# Patient Record
Sex: MALE | Race: WHITE | NOT HISPANIC OR LATINO | Employment: OTHER | URBAN - METROPOLITAN AREA
[De-identification: names, ages, dates, MRNs, and addresses within clinical notes are randomized per-mention and may not be internally consistent; named-entity substitution may affect disease eponyms.]

---

## 2019-01-29 ENCOUNTER — HOSPITAL ENCOUNTER (OUTPATIENT)
Dept: MRI IMAGING | Facility: HOSPITAL | Age: 49
Discharge: HOME | End: 2019-01-29
Attending: PSYCHIATRY & NEUROLOGY

## 2019-05-22 ENCOUNTER — CONVERSION ENCOUNTER (OUTPATIENT)
Dept: FAMILY MEDICINE CLINIC | Facility: CLINIC | Age: 49
End: 2019-05-22

## 2019-05-22 ENCOUNTER — OFFICE VISIT CONVERTED (OUTPATIENT)
Dept: FAMILY MEDICINE CLINIC | Facility: CLINIC | Age: 49
End: 2019-05-22
Attending: FAMILY MEDICINE

## 2019-05-23 ENCOUNTER — HOSPITAL ENCOUNTER (OUTPATIENT)
Dept: FAMILY MEDICINE CLINIC | Facility: CLINIC | Age: 49
Discharge: HOME | End: 2019-05-23
Attending: FAMILY MEDICINE

## 2019-05-23 LAB
25(OH)D3 SERPL-MCNC: 21.6 NG/ML (ref 30–100)
ALBUMIN SERPL-MCNC: 4.5 G/DL (ref 3.5–5)
ALBUMIN/GLOB SERPL: 1.7 {RATIO} (ref 1.4–2.6)
ALP SERPL-CCNC: 62 U/L (ref 53–128)
ALT SERPL-CCNC: 37 U/L (ref 10–40)
ANION GAP SERPL CALC-SCNC: 19 MMOL/L (ref 8–19)
AST SERPL-CCNC: 29 U/L (ref 15–50)
BASOPHILS # BLD AUTO: 0.03 10*3/UL (ref 0–0.2)
BASOPHILS NFR BLD AUTO: 0.5 % (ref 0–3)
BILIRUB SERPL-MCNC: 0.71 MG/DL (ref 0.2–1.3)
BUN SERPL-MCNC: 8 MG/DL (ref 5–25)
BUN/CREAT SERPL: 6 {RATIO} (ref 6–20)
CALCIUM SERPL-MCNC: 9.3 MG/DL (ref 8.7–10.4)
CHLORIDE SERPL-SCNC: 104 MMOL/L (ref 99–111)
CHOLEST SERPL-MCNC: 212 MG/DL (ref 107–200)
CHOLEST/HDLC SERPL: 3.7 {RATIO} (ref 3–6)
CONV ABS IMM GRAN: 0.02 10*3/UL (ref 0–0.2)
CONV CO2: 22 MMOL/L (ref 22–32)
CONV IMMATURE GRAN: 0.3 % (ref 0–1.8)
CONV TOTAL PROTEIN: 7.2 G/DL (ref 6.3–8.2)
CREAT UR-MCNC: 1.28 MG/DL (ref 0.7–1.2)
DEPRECATED RDW RBC AUTO: 43.7 FL (ref 35.1–43.9)
EOSINOPHIL # BLD AUTO: 0.22 10*3/UL (ref 0–0.7)
EOSINOPHIL # BLD AUTO: 3.4 % (ref 0–7)
ERYTHROCYTE [DISTWIDTH] IN BLOOD BY AUTOMATED COUNT: 13.4 % (ref 11.6–14.4)
GFR SERPLBLD BASED ON 1.73 SQ M-ARVRAT: >60 ML/MIN/{1.73_M2}
GLOBULIN UR ELPH-MCNC: 2.7 G/DL (ref 2–3.5)
GLUCOSE SERPL-MCNC: 88 MG/DL (ref 70–99)
HBA1C MFR BLD: 15.7 G/DL (ref 14–18)
HCT VFR BLD AUTO: 48.5 % (ref 42–52)
HDLC SERPL-MCNC: 57 MG/DL (ref 40–60)
LDLC SERPL CALC-MCNC: 137 MG/DL (ref 70–100)
LYMPHOCYTES # BLD AUTO: 2.45 10*3/UL (ref 1–5)
MCH RBC QN AUTO: 28.8 PG (ref 27–31)
MCHC RBC AUTO-ENTMCNC: 32.4 G/DL (ref 33–37)
MCV RBC AUTO: 88.8 FL (ref 80–96)
MONOCYTES # BLD AUTO: 0.47 10*3/UL (ref 0.2–1.2)
MONOCYTES NFR BLD AUTO: 7.4 % (ref 3–10)
NEUTROPHILS # BLD AUTO: 3.2 10*3/UL (ref 2–8)
NEUTROPHILS NFR BLD AUTO: 50.1 % (ref 30–85)
NRBC CBCN: 0 % (ref 0–0.7)
OSMOLALITY SERPL CALC.SUM OF ELEC: 290 MOSM/KG (ref 273–304)
PLATELET # BLD AUTO: 339 10*3/UL (ref 130–400)
PMV BLD AUTO: 11.3 FL (ref 9.4–12.4)
POTASSIUM SERPL-SCNC: 4.1 MMOL/L (ref 3.5–5.3)
RBC # BLD AUTO: 5.46 10*6/UL (ref 4.7–6.1)
SODIUM SERPL-SCNC: 141 MMOL/L (ref 135–147)
T4 FREE SERPL-MCNC: 1.2 NG/DL (ref 0.9–1.8)
TRIGL SERPL-MCNC: 91 MG/DL (ref 40–150)
TSH SERPL-ACNC: 2.21 M[IU]/L (ref 0.27–4.2)
VARIANT LYMPHS NFR BLD MANUAL: 38.3 % (ref 20–45)
VLDLC SERPL-MCNC: 18 MG/DL (ref 5–37)
WBC # BLD AUTO: 6.39 10*3/UL (ref 4.8–10.8)

## 2019-06-06 ENCOUNTER — HOSPITAL ENCOUNTER (OUTPATIENT)
Dept: CARDIOLOGY | Facility: HOSPITAL | Age: 49
Discharge: HOME | End: 2019-06-06
Attending: FAMILY MEDICINE

## 2019-08-20 ENCOUNTER — OFFICE VISIT CONVERTED (OUTPATIENT)
Dept: FAMILY MEDICINE CLINIC | Facility: CLINIC | Age: 49
End: 2019-08-20
Attending: FAMILY MEDICINE

## 2019-08-20 ENCOUNTER — CONVERSION ENCOUNTER (OUTPATIENT)
Dept: FAMILY MEDICINE CLINIC | Facility: CLINIC | Age: 49
End: 2019-08-20

## 2020-04-21 ENCOUNTER — TELEPHONE CONVERTED (OUTPATIENT)
Dept: FAMILY MEDICINE CLINIC | Facility: CLINIC | Age: 50
End: 2020-04-21
Attending: FAMILY MEDICINE

## 2020-05-08 ENCOUNTER — HOSPITAL ENCOUNTER (OUTPATIENT)
Dept: FAMILY MEDICINE CLINIC | Facility: CLINIC | Age: 50
Discharge: HOME | End: 2020-05-08
Attending: FAMILY MEDICINE

## 2020-05-08 LAB
ALBUMIN SERPL-MCNC: 4.4 G/DL (ref 3.5–5)
ALBUMIN/GLOB SERPL: 1.6 {RATIO} (ref 1.4–2.6)
ALP SERPL-CCNC: 67 U/L (ref 56–119)
ALT SERPL-CCNC: 32 U/L (ref 10–40)
ANION GAP SERPL CALC-SCNC: 17 MMOL/L (ref 8–19)
AST SERPL-CCNC: 23 U/L (ref 15–50)
BASOPHILS # BLD AUTO: 0.04 10*3/UL (ref 0–0.2)
BASOPHILS NFR BLD AUTO: 0.6 % (ref 0–3)
BILIRUB SERPL-MCNC: 0.49 MG/DL (ref 0.2–1.3)
BUN SERPL-MCNC: 12 MG/DL (ref 5–25)
BUN/CREAT SERPL: 8 {RATIO} (ref 6–20)
CALCIUM SERPL-MCNC: 9.4 MG/DL (ref 8.7–10.4)
CHLORIDE SERPL-SCNC: 105 MMOL/L (ref 99–111)
CHOLEST SERPL-MCNC: 214 MG/DL (ref 107–200)
CHOLEST/HDLC SERPL: 3.8 {RATIO} (ref 3–6)
CONV ABS IMM GRAN: 0.03 10*3/UL (ref 0–0.2)
CONV CO2: 24 MMOL/L (ref 22–32)
CONV IMMATURE GRAN: 0.5 % (ref 0–1.8)
CONV TOTAL PROTEIN: 7.2 G/DL (ref 6.3–8.2)
CREAT UR-MCNC: 1.42 MG/DL (ref 0.7–1.2)
DEPRECATED RDW RBC AUTO: 40.7 FL (ref 35.1–43.9)
EOSINOPHIL # BLD AUTO: 0.18 10*3/UL (ref 0–0.7)
EOSINOPHIL # BLD AUTO: 2.7 % (ref 0–7)
ERYTHROCYTE [DISTWIDTH] IN BLOOD BY AUTOMATED COUNT: 12.8 % (ref 11.6–14.4)
EST. AVERAGE GLUCOSE BLD GHB EST-MCNC: 128 MG/DL
GFR SERPLBLD BASED ON 1.73 SQ M-ARVRAT: 57 ML/MIN/{1.73_M2}
GLOBULIN UR ELPH-MCNC: 2.8 G/DL (ref 2–3.5)
GLUCOSE SERPL-MCNC: 90 MG/DL (ref 70–99)
HBA1C MFR BLD: 6.1 % (ref 3.5–5.7)
HCT VFR BLD AUTO: 50.8 % (ref 42–52)
HDLC SERPL-MCNC: 57 MG/DL (ref 40–60)
HGB BLD-MCNC: 17 G/DL (ref 14–18)
LDLC SERPL CALC-MCNC: 135 MG/DL (ref 70–100)
LYMPHOCYTES # BLD AUTO: 2.81 10*3/UL (ref 1–5)
LYMPHOCYTES NFR BLD AUTO: 42.4 % (ref 20–45)
MCH RBC QN AUTO: 29.1 PG (ref 27–31)
MCHC RBC AUTO-ENTMCNC: 33.5 G/DL (ref 33–37)
MCV RBC AUTO: 86.8 FL (ref 80–96)
MONOCYTES # BLD AUTO: 0.41 10*3/UL (ref 0.2–1.2)
MONOCYTES NFR BLD AUTO: 6.2 % (ref 3–10)
NEUTROPHILS # BLD AUTO: 3.15 10*3/UL (ref 2–8)
NEUTROPHILS NFR BLD AUTO: 47.6 % (ref 30–85)
NRBC CBCN: 0 % (ref 0–0.7)
OSMOLALITY SERPL CALC.SUM OF ELEC: 291 MOSM/KG (ref 273–304)
PLATELET # BLD AUTO: 361 10*3/UL (ref 130–400)
PMV BLD AUTO: 10.7 FL (ref 9.4–12.4)
POTASSIUM SERPL-SCNC: 4.8 MMOL/L (ref 3.5–5.3)
RBC # BLD AUTO: 5.85 10*6/UL (ref 4.7–6.1)
SODIUM SERPL-SCNC: 141 MMOL/L (ref 135–147)
TRIGL SERPL-MCNC: 109 MG/DL (ref 40–150)
VLDLC SERPL-MCNC: 22 MG/DL (ref 5–37)
WBC # BLD AUTO: 6.62 10*3/UL (ref 4.8–10.8)

## 2020-05-09 LAB — 25(OH)D3 SERPL-MCNC: 26.9 NG/ML (ref 30–100)

## 2020-06-08 ENCOUNTER — HOSPITAL ENCOUNTER (OUTPATIENT)
Dept: ULTRASOUND IMAGING | Facility: HOSPITAL | Age: 50
Discharge: HOME | End: 2020-06-08
Attending: UROLOGY

## 2020-06-09 ENCOUNTER — TELEPHONE CONVERTED (OUTPATIENT)
Dept: UROLOGY | Facility: CLINIC | Age: 50
End: 2020-06-09
Attending: UROLOGY

## 2020-10-20 ENCOUNTER — OFFICE VISIT CONVERTED (OUTPATIENT)
Dept: FAMILY MEDICINE CLINIC | Facility: CLINIC | Age: 50
End: 2020-10-20
Attending: FAMILY MEDICINE

## 2020-10-20 ENCOUNTER — CONVERSION ENCOUNTER (OUTPATIENT)
Dept: FAMILY MEDICINE CLINIC | Facility: CLINIC | Age: 50
End: 2020-10-20

## 2021-05-10 NOTE — H&P
History and Physical      Patient Name: Иван Kearney   Patient ID: [id number]   Sex: Male   YOB: 1970    Primary Care Provider: David Markham DO   Referring Provider: David Markham DO    Visit Date: June 9, 2020    Provider: Jarad Dior MD   Location: [facility]   Location Address: [address] [place] [address]  [id number]   Location Phone: [phone]          Chief Complaint  · urologic issues      History Of Present Illness  TELEHEALTH TELEPHONE VISIT  Иван Kearney is a 50 year old /White male who is presenting for evaluation via telehealth telephone visit. Verbal consent obtained before beginning visit.   Provider spent 11 minutes with the patient during the telehealth visit.   The following staff were present during this visit: Palma Ayala   Past Medical History/ Overview of Patient Symptoms       50-year-old gentleman status post right ureteroscopy    No pain or problems.    4/20 right ureteroscopy with laser and stentall stones removed    stent removed at home.    4/3/2020 CT abdomen/pelvis with1.3 cm proximal right ureteral stone.    This is patient's second stone.    First lithotripsy.       Past Medical History  Allergies; Anxiety; Arthritis; Broken Bones; Deafness; Depression; Head injury; Hemorrhoids; Kidney stones; Migraine; Night sweats; Post traumatic stress disorder (PTSD); Psychiatric illness; Sinus trouble; Skin Disease         Past Surgical History  Back surgery; Brain Surgery; Colonoscopy; Leg Surgery; Spinal Surgery; Ureteroscopic Stone Removal; Wrist Surgery         Medication List  Vitamin D2 1,250 mcg (50,000 unit) oral capsule         Allergy List  NO KNOWN DRUG ALLERGIES         Family Medical History  Breast Neoplasm, Malignant         Social History  *Disabled; Alcohol (Current some day); Denies substance abuse; ; Smokeless tobacco (Current every day); Tobacco (Former)         Review of  Systems  · Constitutional  o Denies  o : chills  · Respiratory  o Denies  o : cough  · Gastrointestinal  o Denies  o : nausea              Assessment  · Nephrolithiasis     592.0/N20.0    Problems Reconciled  Plan  · Orders  o Physician Telephone Evaluation, 11-20 minutes (76956) - 592.0/N20.0 - 06/09/2020  · Medications  o Medications have been Reconciled  o Transition of Care or Provider Policy  · Instructions  o Plan Of Care:   o Electronically Identified Patient Education Materials Provided Electronically       The patient was counseled on the preventative measures of kidney stones today.  This included increasing fluid intake to make at least 1.5 ml daily, decreasing meat intake, decreasing salt intake and taking in a normal amount of calcium (1000 mg daily).  Information handout given on this today.     We also discussed the DASH diet today for stone prevention and handout was given    Follow-up PRN             Electronically Signed by: Jarad Dior MD -Author on June 9, 2020 10:26:16 AM

## 2021-05-12 NOTE — PROGRESS NOTES
Quick Note      Patient Name: Иван Kearney   Patient ID: [id number]   Sex: Male   YOB: 1970    Primary Care Provider: David Markham DO   Referring Provider: David Markham DO    Visit Date: April 21, 2020    Provider: David Markham DO   Location: [facility]   Location Address: [address] [place] [address], [address]  [id number]   Location Phone: [phone]          History Of Present Illness  TELEHEALTH TELEPHONE VISIT  Chief Complaint: check up   Иван Kearney is a 50 year old /White male who is presenting for evaluation via telehealth telephone visit. Verbal consent obtained before beginning visit.   Provider spent 22 minutes with the patient during telehealth visit.   The following staff were present during this visit: provider only   Past Medical History/Overview of Patient Symptoms     Patient presents today for a telehealth appointment.  He is accompanied by his wife, Ghazala who helps provide the history and is listening in on the conversation.  This is a telephone appointment.  Patient reports that he is doing a lot better ever since he had his right ureteral stent removed for a 1.3 cm stone on the right.  He had right-sided obstructive uropathy.  He he was also noted to have a nonobstructing left nephrolithiasis at the lower pole measuring 6 mm.  He ended up not having an infection.  He reports not being treated with any antibiotics.  He was noted to have diffuse prostatomegaly as well.  He denies any significant symptoms of lower urinary tract obstruction.  He does have some urinary hesitancy.  It has not been that bothersome.  He is not interested in any medication today.  Reviewing his labs his creatinine jumped from 1.28 back in May 2019 two 1.70.  Discussed having this repeated.  He was previously noted to have prediabetes.  We will check his A1c as well as well as vitamin D.  His vitamin D level back in May was 21.6.  He will come in and get labs  done at his earliest convenience.  He had a calcium oxalate stone.           Assessment  · Vitamin D deficiency     268.9/E55.9  · Nephrolithiasis     592.0/N20.0  · MAXIME (acute kidney injury)     584.9/N17.9  · Medication monitoring encounter     V58.83/Z51.81  · HLD (hyperlipidemia)     272.4/E78.5  · Leukocytosis     288.60/D72.829  · Enlarged prostate     600.00/N40.0  · Screening for prostate cancer     V76.44/Z12.5  · Prediabetes     790.29/R73.03    Problems Reconciled  Plan  · Orders  o CBC with Auto Diff [facility] (32441) - 288.60/D72.829 - 04/21/2020  o CMP [facility] (37662) - 584.9/N17.9 - 04/21/2020  o Hgb A1c [facility] (22308) - 790.29/R73.03 - 04/21/2020  o Lipid Panel [facility] (41330) - 272.4/E78.5 - 04/21/2020  o Vitamin D (25-Hydroxy) Level (20131) - 268.9/E55.9 - 04/21/2020  o Physician Telephone Evaluation, 21-30 minutes (75617) - - 04/21/2020  · Medications  o Vitamin D2 50,000 unit oral capsule   SIG: take 1 capsule (50,000 unit) by oral route once weekly for 90 days   DISP: (13) capsules with 3 refills  Refilled on 04/21/2020     · Instructions  o Plan Of Care:   o Take all medications as prescribed/directed.  o I spent 22 minutes on medical discussion with patient. Plan as documented above. Plan on seeing patient back in 6 months or sooner if needed. He is to call with any questions or concerns. He is not interested in any treatment for enlarged prostate at this time. He has had resolution of symptoms of right flank pain after he had laser lithotripsy and stent placement with removal. He does have chronic low back pain but is not interested in any treatment at this time. He has had surgery previously. He is still taking vitamin D supplementation. I will refill this.  · Disposition  o Follow Up in 6 months.            Electronically Signed by: David Markham DO -Author on April 21, 2020 11:38:06 AM

## 2021-05-13 ENCOUNTER — CONVERSION ENCOUNTER (OUTPATIENT)
Dept: FAMILY MEDICINE CLINIC | Facility: CLINIC | Age: 51
End: 2021-05-13

## 2021-05-13 ENCOUNTER — OFFICE VISIT CONVERTED (OUTPATIENT)
Dept: FAMILY MEDICINE CLINIC | Facility: CLINIC | Age: 51
End: 2021-05-13
Attending: FAMILY MEDICINE

## 2021-05-13 NOTE — PROGRESS NOTES
Progress Note      Patient Name: Иван Kearney   Patient ID: [id number]   Sex: Male   YOB: 1970    Primary Care Provider: David Markham DO   Referring Provider: David Markham DO    Visit Date: October 20, 2020    Provider: David Markham DO   Location: [facility]   Location Address: [address] [place] [address], [address]  [place], [place]  [id number]   Location Phone: [phone]          Chief Complaint  · check up      History Of Present Illness  Иван Kearney is a 50 year old /White male who presents for evaluation and treatment of:      Patient presents today for checkup.  He reports overall doing well.  He has gained more weight.  He is working on getting back into diet and exercise.  I encouraged him to do so.  Heart rate was elevated on intake.  On repeat it was 100 bpm and the rhythm was otherwise regular.  Blood pressure slightly elevated today.  He admits to drinking a pot of coffee before coming up here.  I encouraged him to decrease his caffeine intake.  He is still drinking a lot of soda as well.  Discussed with him cutting soda out of his diet as well.  He does need his labs repeated prior to next appointment.  Discussed checking a thyroid profile as well as testosterone as he does report fatigue.  Discussed seeing him back in 3 months for checkup.  He is compliant with his vitamin D supplementation.       Past Medical History  Allergies; Anxiety; Arthritis; Broken Bones; Deafness; Depression; Head injury; Hemorrhoids; Kidney stones; Migraine; Night sweats; Post traumatic stress disorder (PTSD); Psychiatric illness; Sinus trouble; Skin Disease         Past Surgical History  Back surgery; Brain Surgery; Colonoscopy; Leg Surgery; Spinal Surgery; Ureteroscopic Stone Removal; Wrist Surgery         Medication List  Vitamin D2 1,250 mcg (50,000 unit) oral capsule         Allergy List  NO KNOWN DRUG ALLERGIES         Family Medical History  Breast  "Neoplasm, Malignant         Social History  *Disabled; Alcohol (Current some day); Denies substance abuse; ; Smokeless tobacco (Current every day); Tobacco (Former)         Review of Systems     General: Denies any fever, chills.  Reports fatigue  HEENT:  Denies any vision or hearing changes. Denies any neck tenderness. Denies any headaches. Denies nasal congestion  Cardiovascular: Denies any chest pain or palpitations  respiratory: Denies any cough or wheezing. Denies any shortness of breath  Gastrointestinal: Denies any nausea vomiting or diarrhea, Denies constipation  Extremities: Denies any edema  Psychiatric: Denies any changes in mood or affect  Neurologic: Denies any neurologic deficits  skin: Denies any rashes or lesions.  endocrine: Fatigue  Musculoskeletal: Denies any weakness       Vitals  Date Time BP Position Site L\R Cuff Size HR RR TEMP (F) WT  HT  BMI kg/m2 BSA m2 O2 Sat FR L/min FiO2 HC       10/20/2020 10:11 /104 Sitting    111 - R  98.2 271lbs 1oz 5'  8\" 41.21 2.43 95 %            Physical Examination     General: AAO 3, no acute distress, pleasant  HEENT: Normocephalic, atraumatic  Cardiovascular: Regular rate and rhythm without appreciable murmur  Respiratory: Clear to auscultation bilaterally no RRW  Gastrointestinal: Soft nontender nondistended with bowel sounds present  extremities: No clubbing, cyanosis or edema  Neurologic: CN II through XII grossly intact   Psychiatric: Normal mood and affect           Assessment  · Fatigue     780.79/R53.83  · Vitamin D deficiency     268.9/E55.9  · Screening for depression     V79.0/Z13.89  · Screening for prostate cancer     V76.44/Z12.5  · HLD (hyperlipidemia)     272.4/E78.5  · Hypertension     401.9/I10  · Medication monitoring encounter     V58.83/Z51.81  · Prediabetes     790.29/R73.03  · Low testosterone     790.99/R79.89  Plan as documented above. Plan on seeing patient back in 3 months or sooner if needed. Patient to call with any " questions or concerns. He will continue taking vitamin D supplementation. We will check testosterone as well as thyroid profile.      Plan  · Orders  o CBC with Auto Diff [facility] (62302) - 401.9/I10, V58.83/Z51.81 - 01/20/2021  o CMP [facility] (85271) - 401.9/I10, V58.83/Z51.81 - 01/20/2021  o Hgb A1c [facility] (97990) - 790.29/R73.03 - 01/20/2021  o Lipid Panel [facility] (22058) - 272.4/E78.5, V58.83/Z51.81 - 01/20/2021  o Thyroid Profile (27030, 81467, THYII) - 780.79/R53.83 - 01/20/2021  o Vitamin D (25-Hydroxy) Level (04129) - 268.9/E55.9, V58.83/Z51.81 - 01/20/2021  o ACO-39: Current medications updated and reviewed (, 1159F) - - 10/20/2020  o ACO-18: Negative screen for clinical depression using a standardized tool () - - 10/20/2020   4 points  o ACO-14: Influenza immunization was not administered for reasons documented [facility] () - - 10/20/2020   out of stock  o Testosterone (Total) (05404) - 790.99/R79.89 - 01/20/2021  o SHBG (sex hormone binding globulin) (06601) - V58.83/Z51.81, 790.99/R79.89 - 01/20/2021  · Instructions  o Depression Screen completed and scanned into the EMR under the designated folder within the patient's documents.  o Today's PHQ-9 result is __4_  o Patient was educated/instructed on their diagnosis, treatment and medications prior to discharge from the clinic today.  o Patient instructed to seek medical attention urgently for new or worsening symptoms.  o Call the office with any concerns or questions.  · Disposition  o Follow Up in 3 months.            Electronically Signed by: David Markham DO - on October 20, 2020 12:35:41 PM

## 2021-05-14 VITALS
BODY MASS INDEX: 41.08 KG/M2 | DIASTOLIC BLOOD PRESSURE: 104 MMHG | HEART RATE: 111 BPM | OXYGEN SATURATION: 95 % | WEIGHT: 271.06 LBS | SYSTOLIC BLOOD PRESSURE: 138 MMHG | TEMPERATURE: 98.2 F | HEIGHT: 68 IN

## 2021-05-15 VITALS
SYSTOLIC BLOOD PRESSURE: 126 MMHG | BODY MASS INDEX: 40.07 KG/M2 | WEIGHT: 264.37 LBS | OXYGEN SATURATION: 96 % | HEIGHT: 68 IN | DIASTOLIC BLOOD PRESSURE: 90 MMHG | HEART RATE: 90 BPM | TEMPERATURE: 98.1 F

## 2021-05-15 VITALS
TEMPERATURE: 98.2 F | DIASTOLIC BLOOD PRESSURE: 84 MMHG | OXYGEN SATURATION: 94 % | HEIGHT: 68 IN | BODY MASS INDEX: 39.91 KG/M2 | SYSTOLIC BLOOD PRESSURE: 118 MMHG | HEART RATE: 96 BPM | WEIGHT: 263.37 LBS

## 2021-05-24 ENCOUNTER — HOSPITAL ENCOUNTER (OUTPATIENT)
Dept: FAMILY MEDICINE CLINIC | Facility: CLINIC | Age: 51
Discharge: HOME | End: 2021-05-24
Attending: FAMILY MEDICINE

## 2021-05-24 LAB
25(OH)D3 SERPL-MCNC: 27.2 NG/ML (ref 30–100)
ALBUMIN SERPL-MCNC: 4.2 G/DL (ref 3.5–5)
ALBUMIN/GLOB SERPL: 1.3 {RATIO} (ref 1.4–2.6)
ALP SERPL-CCNC: 61 U/L (ref 56–119)
ALT SERPL-CCNC: 30 U/L (ref 10–40)
ANION GAP SERPL CALC-SCNC: 15 MMOL/L (ref 8–19)
AST SERPL-CCNC: 24 U/L (ref 15–50)
BASOPHILS # BLD AUTO: 0.05 10*3/UL (ref 0–0.2)
BASOPHILS NFR BLD AUTO: 0.6 % (ref 0–3)
BILIRUB SERPL-MCNC: 0.58 MG/DL (ref 0.2–1.3)
BUN SERPL-MCNC: 14 MG/DL (ref 5–25)
BUN/CREAT SERPL: 10 {RATIO} (ref 6–20)
CALCIUM SERPL-MCNC: 9.1 MG/DL (ref 8.7–10.4)
CHLORIDE SERPL-SCNC: 104 MMOL/L (ref 99–111)
CHOLEST SERPL-MCNC: 210 MG/DL (ref 107–200)
CHOLEST/HDLC SERPL: 3.6 {RATIO} (ref 3–6)
CONV ABS IMM GRAN: 0.04 10*3/UL (ref 0–0.2)
CONV CO2: 24 MMOL/L (ref 22–32)
CONV IMMATURE GRAN: 0.5 % (ref 0–1.8)
CONV TOTAL PROTEIN: 7.4 G/DL (ref 6.3–8.2)
CREAT UR-MCNC: 1.35 MG/DL (ref 0.7–1.2)
DEPRECATED RDW RBC AUTO: 41.1 FL (ref 35.1–43.9)
EOSINOPHIL # BLD AUTO: 0.18 10*3/UL (ref 0–0.7)
EOSINOPHIL # BLD AUTO: 2.1 % (ref 0–7)
ERYTHROCYTE [DISTWIDTH] IN BLOOD BY AUTOMATED COUNT: 12.9 % (ref 11.6–14.4)
EST. AVERAGE GLUCOSE BLD GHB EST-MCNC: 117 MG/DL
GFR SERPLBLD BASED ON 1.73 SQ M-ARVRAT: 60 ML/MIN/{1.73_M2}
GLOBULIN UR ELPH-MCNC: 3.2 G/DL (ref 2–3.5)
GLUCOSE SERPL-MCNC: 89 MG/DL (ref 70–99)
HBA1C MFR BLD: 5.7 % (ref 3.5–5.7)
HCT VFR BLD AUTO: 47.6 % (ref 42–52)
HDLC SERPL-MCNC: 59 MG/DL (ref 40–60)
HGB BLD-MCNC: 16 G/DL (ref 14–18)
LDLC SERPL CALC-MCNC: 131 MG/DL (ref 70–100)
LYMPHOCYTES # BLD AUTO: 3.21 10*3/UL (ref 1–5)
LYMPHOCYTES NFR BLD AUTO: 38 % (ref 20–45)
MCH RBC QN AUTO: 29.3 PG (ref 27–31)
MCHC RBC AUTO-ENTMCNC: 33.6 G/DL (ref 33–37)
MCV RBC AUTO: 87.2 FL (ref 80–96)
MONOCYTES # BLD AUTO: 0.66 10*3/UL (ref 0.2–1.2)
MONOCYTES NFR BLD AUTO: 7.8 % (ref 3–10)
NEUTROPHILS # BLD AUTO: 4.3 10*3/UL (ref 2–8)
NEUTROPHILS NFR BLD AUTO: 51 % (ref 30–85)
NRBC CBCN: 0 % (ref 0–0.7)
OSMOLALITY SERPL CALC.SUM OF ELEC: 288 MOSM/KG (ref 273–304)
PLATELET # BLD AUTO: 356 10*3/UL (ref 130–400)
PMV BLD AUTO: 11.1 FL (ref 9.4–12.4)
POTASSIUM SERPL-SCNC: 4.3 MMOL/L (ref 3.5–5.3)
RBC # BLD AUTO: 5.46 10*6/UL (ref 4.7–6.1)
SODIUM SERPL-SCNC: 139 MMOL/L (ref 135–147)
T4 FREE SERPL-MCNC: 1.2 NG/DL (ref 0.9–1.8)
TESTOST SERPL-MCNC: 293 NG/DL (ref 193–740)
TRIGL SERPL-MCNC: 98 MG/DL (ref 40–150)
TSH SERPL-ACNC: 2.24 M[IU]/L (ref 0.27–4.2)
VLDLC SERPL-MCNC: 20 MG/DL (ref 5–37)
WBC # BLD AUTO: 8.44 10*3/UL (ref 4.8–10.8)

## 2021-05-25 LAB — SHBG SERPL-SCNC: 22 NMOL/L (ref 19.3–76.4)

## 2021-06-05 NOTE — PROGRESS NOTES
Progress Note      Patient Name: Иван Kearney   Patient ID: [id number]   Sex: Male   YOB: 1970    Primary Care Provider: David Markham DO   Referring Provider: David Markham DO    Visit Date: May 13, 2021    Provider: David Markham DO   Location: [facility]   Location Address: [address] [place] [address], [address] [place] [address]  [id number]   Location Phone: [phone]          Chief Complaint  · check up  · migraines      History Of Present Illness  Иван Kearney is a 51 year old /White male who presents for evaluation and treatment of:      Presents today for general checkup.  I last saw him he had some issues/concerns about fatigue.  I ordered labs however he did not get these done prior to his January appointment.  He will get these done at his earliest convenience.  He does have issues with migraine headaches and gets more than 6 months but does not want to be on a preventative.  He has been on Imitrex previously and has tolerated it well.  He denies any history of heart disease.  Denies any chest pain when taking Imitrex.  Heart rate slightly elevated today.  He does report drinking a strong coffee before coming in.       Past Medical History  Allergies; Anxiety; Arthritis; Broken Bones; Deafness; Depression; Head injury; Hemorrhoids; Kidney Stones; Migraine; Night sweats; Post traumatic stress disorder (PTSD); Psychiatric illness; Sinus trouble; Skin Disease         Past Surgical History  Back surgery; Brain Surgery; Colonoscopy; Leg Surgery; Spinal Surgery; Ureteroscopic Stone Removal; Wrist Surgery         Medication List  Vitamin D2 1,250 mcg (50,000 unit) oral capsule         Allergy List  NO KNOWN DRUG ALLERGIES         Family Medical History  Breast Neoplasm, Malignant         Social History  *Disabled; Alcohol (Current some day); Denies substance abuse; ; Smokeless tobacco (Current every day); Tobacco (Former)         Review of  "Systems     Gen: Denies any fever, chills, or weight changes  HEENT: Denies any changes in vision or hearing, denies nasal congestion, denies any neck tenderness or lymphadenopathy  Extremities: Denies edema  Psychiatric: Denies any changes in mood or affect  Neurologic: Migraines  Skin: Denies any rashes       Vitals  Date Time BP Position Site L\R Cuff Size HR RR TEMP (F) WT  HT  BMI kg/m2 BSA m2 O2 Sat FR L/min FiO2 HC       05/13/2021 11:08 /88 Sitting    100 - R  97.5 272lbs 2oz 5'  8\" 41.38 2.43 98 %            Physical Examination     General: AAO 3, no acute distress, pleasant  HEENT: Normocephalic, atraumatic  Cardiovascular: Regular rate and rhythm without appreciable murmur  Respiratory: Clear to auscultation bilaterally no RRW  Gastrointestinal: Soft nontender nondistended with bowel sounds present  extremities: No edema  Neurologic: CN II through XII grossly intact   Psychiatric: Normal mood and affect           Assessment  · Fatigue     780.79/R53.83  · Migraine     346.90/G43.909      Plan  · Orders  o ACO-39: Current medications updated and reviewed (1159F, ) - - 05/13/2021  · Medications  o Imitrex 100 mg oral tablet   SIG: take 1 tab PO with fluids at onset of a migraine attack; repeat after 2 hours if headache returns   DISP: (20) Tablet with 1 refills  Prescribed on 05/13/2021     o Vitamin D2 1,250 mcg (50,000 unit) oral capsule   SIG: take 1 capsule (50,000 unit) by oral route once weekly for 90 days   DISP: (13) Capsule with 3 refills  Refilled on 05/13/2021     o Medications have been Reconciled  o Transition of Care or Provider Policy  · Instructions  o Patient was educated/instructed on their diagnosis, treatment and medications prior to discharge from the clinic today.  o Patient instructed to seek medical attention urgently for new or worsening symptoms.  o Call the office with any concerns or questions.  o Imitrex has been prescribed for patient today. Risk and benefits " discussed. Vitamin D has been refilled. I will see him back in 6 months or sooner if needed. Patient is directed to call with any questions or concerns.  · Disposition  o Follow Up in 6 months.            Electronically Signed by: David Markham DO - on May 13, 2021 01:14:24 PM

## 2021-07-15 VITALS
OXYGEN SATURATION: 98 % | HEIGHT: 68 IN | TEMPERATURE: 97.5 F | SYSTOLIC BLOOD PRESSURE: 138 MMHG | BODY MASS INDEX: 41.24 KG/M2 | HEART RATE: 100 BPM | DIASTOLIC BLOOD PRESSURE: 88 MMHG | WEIGHT: 272.12 LBS

## 2021-11-16 ENCOUNTER — OFFICE VISIT (OUTPATIENT)
Dept: FAMILY MEDICINE CLINIC | Facility: CLINIC | Age: 51
End: 2021-11-16

## 2021-11-16 VITALS
TEMPERATURE: 97.7 F | BODY MASS INDEX: 42.1 KG/M2 | SYSTOLIC BLOOD PRESSURE: 142 MMHG | DIASTOLIC BLOOD PRESSURE: 90 MMHG | WEIGHT: 277.8 LBS | HEART RATE: 101 BPM | HEIGHT: 68 IN | OXYGEN SATURATION: 97 %

## 2021-11-16 DIAGNOSIS — Z51.81 MEDICATION MONITORING ENCOUNTER: ICD-10-CM

## 2021-11-16 DIAGNOSIS — Z12.5 SCREENING FOR PROSTATE CANCER: ICD-10-CM

## 2021-11-16 DIAGNOSIS — R73.03 PREDIABETES: ICD-10-CM

## 2021-11-16 DIAGNOSIS — G43.809 OTHER MIGRAINE WITHOUT STATUS MIGRAINOSUS, NOT INTRACTABLE: Primary | ICD-10-CM

## 2021-11-16 DIAGNOSIS — E78.00 PURE HYPERCHOLESTEROLEMIA: ICD-10-CM

## 2021-11-16 DIAGNOSIS — N17.9 AKI (ACUTE KIDNEY INJURY) (HCC): ICD-10-CM

## 2021-11-16 DIAGNOSIS — E55.9 VITAMIN D DEFICIENCY: ICD-10-CM

## 2021-11-16 DIAGNOSIS — M54.2 NECK PAIN: ICD-10-CM

## 2021-11-16 PROCEDURE — 99214 OFFICE O/P EST MOD 30 MIN: CPT | Performed by: FAMILY MEDICINE

## 2021-11-16 RX ORDER — ERGOCALCIFEROL 1.25 MG/1
CAPSULE ORAL
COMMUNITY
Start: 2021-11-06 | End: 2022-01-24 | Stop reason: SDUPTHER

## 2021-11-16 RX ORDER — SUMATRIPTAN 100 MG/1
TABLET, FILM COATED ORAL
COMMUNITY
Start: 2021-11-11 | End: 2021-11-16 | Stop reason: SDUPTHER

## 2021-11-16 RX ORDER — SUMATRIPTAN 100 MG/1
100 TABLET, FILM COATED ORAL ONCE AS NEEDED
Qty: 30 TABLET | Refills: 3 | Status: SHIPPED | OUTPATIENT
Start: 2021-11-16 | End: 2022-01-24 | Stop reason: SDUPTHER

## 2021-11-16 RX ORDER — TIZANIDINE 4 MG/1
4 TABLET ORAL EVERY 8 HOURS PRN
Qty: 90 TABLET | Refills: 1 | Status: SHIPPED | OUTPATIENT
Start: 2021-11-16

## 2021-11-16 NOTE — PROGRESS NOTES
"Chief Complaint  Follow-up, Headache, and Neck Pain    Subjective          Иван Kearney presents to [facility]  History of Present Illness  Patient presents today for follow-up for migraine headaches.  He reports having had about 5 migraine headaches this month.  Admits that a lot of his headaches are stemming from neck pain issues.  He denies any radiation of pain down to his arms.  He reports taking some old hydrocodone that was prescribed about 4 years ago.  I discussed with patient proper evaluation for this.  We will start with an x-ray of the cervical spine.  He reports that he would have taken a muscle relaxer if he had one available.  He is requesting one today.  He will be due for labs when he returns for follow-up in 1 month.  He does take vitamin D for deficiency.  Not requesting refill at this time.  He is requesting refill of Imitrex.  He has taken this without adverse effect.  Admits that it does help out.  I reviewed his previous labs.  He was shown to have prediabetes and elevated LDL.  Plan to reassess these labs when he returns for follow-up.  Objective   Vital Signs:   /90   Pulse 101   Temp 97.7 °F (36.5 °C)   Ht 172.7 cm (68\")   Wt 126 kg (277 lb 12.8 oz)   SpO2 97%   BMI 42.24 kg/m²     Physical Exam   General: AAO ×3, no acute distress, pleasant  HEENT: Normocephalic, atraumatic  Musculoskeletal: Paraspinal hypertonicity of the cervical spine.  Tender to palpation bilaterally more at the base of the cervical spine.  No mass or deformity appreciated  Cardiovascular: Regular rate and rhythm without appreciable murmur  Respiratory: Clear to auscultation bilaterally no RRW  Gastrointestinal: Soft nontender nondistended with bowel sounds present  extremities: No edema  Neurologic: CN II through XII grossly intact   Psychiatric: Normal mood and affect  Result Review :                 Assessment and Plan    Diagnoses and all orders for this " visit:    1. Other migraine without status migrainosus, not intractable (Primary)    2. Neck pain  -     XR Spine Cervical Complete 4 or 5 View; Future    3. Vitamin D deficiency  -     Vitamin D 25 Hydroxy; Future    4. Prediabetes  -     CBC & Differential; Future  -     Comprehensive Metabolic Panel; Future  -     Hemoglobin A1c; Future    5. Pure hypercholesterolemia  -     Lipid Panel; Future    6. Medication monitoring encounter  -     CBC & Differential; Future  -     Comprehensive Metabolic Panel; Future  -     Lipid Panel; Future  -     Vitamin D 25 Hydroxy; Future  -     Hemoglobin A1c; Future  -     PSA Screen; Future    7. MAXIME (acute kidney injury) (HCC)  -     Comprehensive Metabolic Panel; Future    8. Screening for prostate cancer  -     PSA Screen; Future    Other orders  -     SUMAtriptan (IMITREX) 100 MG tablet; Take 1 tablet by mouth 1 (One) Time As Needed for Migraine.  Dispense: 30 tablet; Refill: 3  -     tiZANidine (ZANAFLEX) 4 MG tablet; Take 1 tablet by mouth Every 8 (Eight) Hours As Needed for Muscle Spasms.  Dispense: 90 tablet; Refill: 1  -     Diclofenac Sodium (VOLTAREN) 1 % gel gel; Apply 4 g topically to the appropriate area as directed 4 (Four) Times a Day As Needed (neck pain).  Dispense: 100 g; Refill: 1    Plan as documented above.  We discussed continue current migraine headache management.  I will get an x-ray of the cervical spine as it appears that his migraines are being triggered by pain in his neck.  Further recommendations to follow once results return.  I will give him Zanaflex as well as Voltaren.  Patient instructed to avoid operating heavy machinery or equipment while taking Zanaflex due to drowsiness side effect.  Plan to have labs updated when he returns for follow-up.  I will see him back in 1 month.    Follow Up   Return in about 1 month (around 12/16/2021) for neck pain.  Patient was given instructions and counseling regarding his condition or for health  maintenance advice. Please see specific information pulled into the AVS if appropriate.

## 2021-11-22 ENCOUNTER — HOSPITAL ENCOUNTER (OUTPATIENT)
Dept: GENERAL RADIOLOGY | Facility: HOSPITAL | Age: 51
Discharge: HOME | End: 2021-11-22
Admitting: FAMILY MEDICINE

## 2021-11-22 DIAGNOSIS — M54.2 NECK PAIN: ICD-10-CM

## 2021-11-22 PROCEDURE — 72050 X-RAY EXAM NECK SPINE 4/5VWS: CPT

## 2021-12-21 ENCOUNTER — OFFICE VISIT (OUTPATIENT)
Dept: FAMILY MEDICINE CLINIC | Facility: CLINIC | Age: 51
End: 2021-12-21

## 2021-12-21 VITALS
BODY MASS INDEX: 40.62 KG/M2 | HEART RATE: 140 BPM | HEIGHT: 68 IN | SYSTOLIC BLOOD PRESSURE: 130 MMHG | TEMPERATURE: 98.9 F | OXYGEN SATURATION: 93 % | DIASTOLIC BLOOD PRESSURE: 76 MMHG | WEIGHT: 268 LBS

## 2021-12-21 DIAGNOSIS — J01.00 ACUTE NON-RECURRENT MAXILLARY SINUSITIS: Primary | ICD-10-CM

## 2021-12-21 DIAGNOSIS — R05.9 COUGH: ICD-10-CM

## 2021-12-21 DIAGNOSIS — R06.02 SHORTNESS OF BREATH: ICD-10-CM

## 2021-12-21 DIAGNOSIS — M50.30 DDD (DEGENERATIVE DISC DISEASE), CERVICAL: ICD-10-CM

## 2021-12-21 DIAGNOSIS — G43.809 OTHER MIGRAINE WITHOUT STATUS MIGRAINOSUS, NOT INTRACTABLE: ICD-10-CM

## 2021-12-21 LAB — SARS-COV-2 RNA PNL SPEC NAA+PROBE: DETECTED

## 2021-12-21 PROCEDURE — U0004 COV-19 TEST NON-CDC HGH THRU: HCPCS | Performed by: FAMILY MEDICINE

## 2021-12-21 PROCEDURE — 99214 OFFICE O/P EST MOD 30 MIN: CPT | Performed by: FAMILY MEDICINE

## 2021-12-21 PROCEDURE — U0005 INFEC AGEN DETEC AMPLI PROBE: HCPCS | Performed by: FAMILY MEDICINE

## 2021-12-21 RX ORDER — LEVOFLOXACIN 750 MG/1
750 TABLET ORAL DAILY
Qty: 7 TABLET | Refills: 0 | Status: SHIPPED | OUTPATIENT
Start: 2021-12-21 | End: 2021-12-28

## 2021-12-21 RX ORDER — BROMPHENIRAMINE MALEATE, PSEUDOEPHEDRINE HYDROCHLORIDE, AND DEXTROMETHORPHAN HYDROBROMIDE 2; 30; 10 MG/5ML; MG/5ML; MG/5ML
5 SYRUP ORAL 4 TIMES DAILY PRN
Qty: 140 ML | Refills: 0 | Status: SHIPPED | OUTPATIENT
Start: 2021-12-21 | End: 2021-12-28

## 2021-12-21 RX ORDER — PREDNISONE 20 MG/1
40 TABLET ORAL DAILY
Qty: 10 TABLET | Refills: 0 | Status: SHIPPED | OUTPATIENT
Start: 2021-12-21 | End: 2021-12-26

## 2021-12-21 NOTE — PROGRESS NOTES
"Chief Complaint  Sinus Problem, Cough, Chills, and Neck Pain    Subjective          Иван Kearney presents to [facility]  History of Present Illness  Patient presents today to follow-up for neck pain. I last saw him on 11/16/2021. I did order a x-ray of his neck for further evaluation which showed mild degenerative changes. Reports that his neck pain is better now. Unfortunately with his neck pain issues he has had more issues with migraine headaches. He reports he has had about 6-8 migraine headaches a month. He has been on the more controlled in about 6 migraine headaches a month until recently. He has not been on a controller medication recently as he reports previously being on 1 but reports gaining weight. He does not remember the name of the medication. He does take Imitrex on an as-needed basis. [facility] is still receiving treatment for migraine headaches.  Patient is here for separate issue today which is a possible sinus infection.  Symptoms started 2 weeks ago around 12/7/2021 but have persisted.  He reports having sinus congestion.  His symptoms improved but then started to worsen again.  He now has congestion in his chest as well and reports coughing up phlegm.  He reports having body aches and chills.  Oxygen saturation is 93% today.  On intake his heart rate was elevated at 140.  Repeat was 115.  I personally repeated his heart rate and it was 118 bpm when I came in to see the patient.  He denies any sore throat.  Temperature is 100.3.  Denies any noted fever.  He has not been vaccinated for COVID-19.  He denies any recent sick contacts  Objective   Vital Signs:   /76   Pulse (!) 140   Temp 98.9 °F (37.2 °C)   Ht 172.7 cm (68\")   Wt 122 kg (268 lb)   SpO2 93%   BMI 40.75 kg/m²     Physical Exam   General: AAO ×3, fatigued appearing, congested  HEENT: Normocephalic, atraumatic, no discharge in the eyes, " nasal congestion noted bilaterally with maxillary and frontal sinus tenderness to palpation, there is no oropharyngeal exudates, there is postnasal drip and erythema, no cervical tenderness or lymphadenopathy  Cardiovascular: Regular rate and rhythm without appreciable murmur  Respiratory: Clear to auscultation bilaterally no RRW.  Cough noted with deep inspiration  Gastrointestinal: Soft nontender nondistended with bowel sounds present  extremities: No edema  Neurologic: CN II through XII grossly intact   Psychiatric: Normal mood and affect  Result Review :                 Assessment and Plan    Diagnoses and all orders for this visit:    1. Cough (Primary)  -     XR Chest PA & Lateral; Future  -     COVID-19,APTIMA PANTHER(TONI),[facility], NP/OP SWAB IN UTM/VTM/[facility] [employer] [facility],24 HR TAT - Swab, Nasopharynx    2. Acute non-recurrent maxillary sinusitis    3. Shortness of breath  -     XR Chest PA & Lateral; Future    4. DDD (degenerative disc disease), cervical    5. Other migraine without status migrainosus, not intractable    Other orders  -     levoFLOXacin (Levaquin) 750 MG tablet; Take 1 tablet by mouth Daily for 7 days.  Dispense: 7 tablet; Refill: 0  -     predniSONE (DELTASONE) 20 MG tablet; Take 2 tablets by mouth Daily for 5 days.  Dispense: 10 tablet; Refill: 0  -     brompheniramine-pseudoephedrine-DM 30-2-10 MG/5ML syrup; Take 5 mL by mouth 4 (Four) Times a Day As Needed for Allergies for up to 7 days.  Dispense: 140 mL; Refill: 0    Discussed treating patient for sinusitis.  He has been symptomatic for about 14 days.  I am concerned that he also has underlying pneumonia.  I would like to go ahead and treat him with Levaquin and prednisone.  He is describing some end expiratory wheezing at nighttime as well.  I will give him Bromfed.  Plan on getting a chest x-ray.  Patient to be Covid test today.  He is encouraged to quarantine at home until results return.  Patient encouraged to stay  well-hydrated.  Further instructions depending on clinical course.  I will see patient back in 1 month.  His neck pain has improved.  We discussed continue current management of migraine headaches.  He may need to be on a controller in the future if he continues to have regular migraine headaches.    Follow Up   Return in about 1 month (around 1/21/2022) for migraine.  Patient was given instructions and counseling regarding his condition or for health maintenance advice. Please see specific information pulled into the AVS if appropriate.

## 2022-01-24 ENCOUNTER — OFFICE VISIT (OUTPATIENT)
Dept: FAMILY MEDICINE CLINIC | Facility: CLINIC | Age: 52
End: 2022-01-24

## 2022-01-24 VITALS
OXYGEN SATURATION: 96 % | TEMPERATURE: 98.6 F | DIASTOLIC BLOOD PRESSURE: 84 MMHG | HEIGHT: 68 IN | BODY MASS INDEX: 39.84 KG/M2 | WEIGHT: 262.9 LBS | HEART RATE: 98 BPM | SYSTOLIC BLOOD PRESSURE: 120 MMHG

## 2022-01-24 DIAGNOSIS — G43.809 OTHER MIGRAINE WITHOUT STATUS MIGRAINOSUS, NOT INTRACTABLE: Primary | ICD-10-CM

## 2022-01-24 DIAGNOSIS — E55.9 VITAMIN D DEFICIENCY: ICD-10-CM

## 2022-01-24 DIAGNOSIS — Z51.81 MEDICATION MONITORING ENCOUNTER: ICD-10-CM

## 2022-01-24 DIAGNOSIS — U07.1 COVID-19 VIRUS INFECTION: ICD-10-CM

## 2022-01-24 DIAGNOSIS — N17.9 AKI (ACUTE KIDNEY INJURY): ICD-10-CM

## 2022-01-24 DIAGNOSIS — E78.00 PURE HYPERCHOLESTEROLEMIA: ICD-10-CM

## 2022-01-24 DIAGNOSIS — M50.30 DDD (DEGENERATIVE DISC DISEASE), CERVICAL: ICD-10-CM

## 2022-01-24 DIAGNOSIS — R73.03 PREDIABETES: ICD-10-CM

## 2022-01-24 DIAGNOSIS — Z12.5 SCREENING FOR PROSTATE CANCER: ICD-10-CM

## 2022-01-24 LAB
25(OH)D3 SERPL-MCNC: 33 NG/ML
ALBUMIN SERPL-MCNC: 4.3 G/DL (ref 3.5–5.2)
ALBUMIN/GLOB SERPL: 1.5 G/DL
ALP SERPL-CCNC: 62 U/L (ref 39–117)
ALT SERPL W P-5'-P-CCNC: 31 U/L (ref 1–41)
ANION GAP SERPL CALCULATED.3IONS-SCNC: 9.2 MMOL/L (ref 5–15)
AST SERPL-CCNC: 26 U/L (ref 1–40)
BASOPHILS # BLD AUTO: 0.05 10*3/MM3 (ref 0–0.2)
BASOPHILS NFR BLD AUTO: 0.8 % (ref 0–1.5)
BILIRUB SERPL-MCNC: 0.8 MG/DL (ref 0–1.2)
BUN SERPL-MCNC: 12 MG/DL (ref 6–20)
BUN/CREAT SERPL: 9 (ref 7–25)
CALCIUM SPEC-SCNC: 9.6 MG/DL (ref 8.6–10.5)
CHLORIDE SERPL-SCNC: 103 MMOL/L (ref 98–107)
CHOLEST SERPL-MCNC: 250 MG/DL (ref 0–200)
CO2 SERPL-SCNC: 25.8 MMOL/L (ref 22–29)
CREAT SERPL-MCNC: 1.34 MG/DL (ref 0.76–1.27)
DEPRECATED RDW RBC AUTO: 44 FL (ref 37–54)
EOSINOPHIL # BLD AUTO: 0.14 10*3/MM3 (ref 0–0.4)
EOSINOPHIL NFR BLD AUTO: 2.3 % (ref 0.3–6.2)
ERYTHROCYTE [DISTWIDTH] IN BLOOD BY AUTOMATED COUNT: 13.7 % (ref 12.3–15.4)
GFR SERPL CREATININE-BSD FRML MDRD: 56 ML/MIN/1.73
GLOBULIN UR ELPH-MCNC: 2.9 GM/DL
GLUCOSE SERPL-MCNC: 78 MG/DL (ref 65–99)
HBA1C MFR BLD: 6.37 % (ref 4.8–5.6)
HCT VFR BLD AUTO: 47.8 % (ref 37.5–51)
HDLC SERPL-MCNC: 65 MG/DL (ref 40–60)
HGB BLD-MCNC: 16 G/DL (ref 13–17.7)
IMM GRANULOCYTES # BLD AUTO: 0.04 10*3/MM3 (ref 0–0.05)
IMM GRANULOCYTES NFR BLD AUTO: 0.7 % (ref 0–0.5)
LDLC SERPL CALC-MCNC: 169 MG/DL (ref 0–100)
LDLC/HDLC SERPL: 2.56 {RATIO}
LYMPHOCYTES # BLD AUTO: 2.38 10*3/MM3 (ref 0.7–3.1)
LYMPHOCYTES NFR BLD AUTO: 39.4 % (ref 19.6–45.3)
MCH RBC QN AUTO: 29.6 PG (ref 26.6–33)
MCHC RBC AUTO-ENTMCNC: 33.5 G/DL (ref 31.5–35.7)
MCV RBC AUTO: 88.5 FL (ref 79–97)
MONOCYTES # BLD AUTO: 0.63 10*3/MM3 (ref 0.1–0.9)
MONOCYTES NFR BLD AUTO: 10.4 % (ref 5–12)
NEUTROPHILS NFR BLD AUTO: 2.8 10*3/MM3 (ref 1.7–7)
NEUTROPHILS NFR BLD AUTO: 46.4 % (ref 42.7–76)
NRBC BLD AUTO-RTO: 0 /100 WBC (ref 0–0.2)
PLATELET # BLD AUTO: 307 10*3/MM3 (ref 140–450)
PMV BLD AUTO: 11 FL (ref 6–12)
POTASSIUM SERPL-SCNC: 4.6 MMOL/L (ref 3.5–5.2)
PROT SERPL-MCNC: 7.2 G/DL (ref 6–8.5)
PSA SERPL-MCNC: 1.19 NG/ML (ref 0–4)
RBC # BLD AUTO: 5.4 10*6/MM3 (ref 4.14–5.8)
SODIUM SERPL-SCNC: 138 MMOL/L (ref 136–145)
TRIGL SERPL-MCNC: 93 MG/DL (ref 0–150)
VLDLC SERPL-MCNC: 16 MG/DL (ref 5–40)
WBC NRBC COR # BLD: 6.04 10*3/MM3 (ref 3.4–10.8)

## 2022-01-24 PROCEDURE — 80061 LIPID PANEL: CPT | Performed by: FAMILY MEDICINE

## 2022-01-24 PROCEDURE — 99214 OFFICE O/P EST MOD 30 MIN: CPT | Performed by: FAMILY MEDICINE

## 2022-01-24 PROCEDURE — 83036 HEMOGLOBIN GLYCOSYLATED A1C: CPT | Performed by: FAMILY MEDICINE

## 2022-01-24 PROCEDURE — 85025 COMPLETE CBC W/AUTO DIFF WBC: CPT | Performed by: FAMILY MEDICINE

## 2022-01-24 PROCEDURE — 82306 VITAMIN D 25 HYDROXY: CPT | Performed by: FAMILY MEDICINE

## 2022-01-24 PROCEDURE — G0103 PSA SCREENING: HCPCS | Performed by: FAMILY MEDICINE

## 2022-01-24 PROCEDURE — 80053 COMPREHEN METABOLIC PANEL: CPT | Performed by: FAMILY MEDICINE

## 2022-01-24 RX ORDER — SUMATRIPTAN 100 MG/1
100 TABLET, FILM COATED ORAL ONCE AS NEEDED
Qty: 30 TABLET | Refills: 3 | Status: SHIPPED | OUTPATIENT
Start: 2022-01-24

## 2022-01-24 RX ORDER — ERGOCALCIFEROL 1.25 MG/1
50000 CAPSULE ORAL
Qty: 13 CAPSULE | Refills: 3 | Status: SHIPPED | OUTPATIENT
Start: 2022-01-24

## 2022-01-24 RX ORDER — TOPIRAMATE 25 MG/1
25 TABLET ORAL 2 TIMES DAILY
Qty: 60 TABLET | Refills: 2 | Status: SHIPPED | OUTPATIENT
Start: 2022-01-24 | End: 2022-10-04

## 2022-01-24 NOTE — PROGRESS NOTES
"Chief Complaint  Follow up for covid-19  Migraine headaches  Vitamin d deficiency     Subjective          Иван Kearney presents to [facility]  History of Present Illness  Patient presents today to follow-up for COVID-19.  Last time I saw him he tested positive for COVID-19.  He reports that his symptoms have resolved.  This was back on 12/21/2021.  He is due for labs today so we discussed getting these done.  He is still having 7 or 8 migraine headaches a month.  We discussed adding Topamax today after risk and benefits were discussed.  He does take Imitrex which does help out however I discussed with him given frequency to add Topamax as a preventative.  He is requesting refill of Imitrex and vitamin D today.  Plan to check vitamin D levels as well.  He does take 50,000 units once a week.  Patient reports that his neck pain has also improved taking tizanidine.  Objective   Vital Signs:   /84   Pulse 98   Temp 98.6 °F (37 °C)   Ht 172.7 cm (68\")   Wt 119 kg (262 lb 14.4 oz)   SpO2 96%   BMI 39.97 kg/m²     Physical Exam   General: AAO ×3, no acute distress, pleasant  HEENT: Normocephalic, atraumatic  Cardiovascular: Regular rate and rhythm without appreciable murmur  Respiratory: Clear to auscultation bilaterally no RRW  Gastrointestinal: Soft nontender nondistended with bowel sounds present  extremities: No edema  Neurologic: CN II through XII grossly intact   Psychiatric: Normal mood and affect  Result Review :                 Assessment and Plan    Diagnoses and all orders for this visit:    1. Other migraine without status migrainosus, not intractable (Primary)    2. COVID-19 virus infection    3. DDD (degenerative disc disease), cervical    4. Vitamin D deficiency    Other orders  -     topiramate (TOPAMAX) 25 MG tablet; Take 1 tablet by mouth 2 (Two) Times a Day.  Dispense: 60 tablet; Refill: 2  -     vitamin D (ERGOCALCIFEROL) 1.25 MG (61140 UT) capsule " capsule; Take 1 capsule by mouth Every 7 (Seven) Days.  Dispense: 13 capsule; Refill: 3  -     SUMAtriptan (IMITREX) 100 MG tablet; Take 1 tablet by mouth 1 (One) Time As Needed for Migraine.  Dispense: 30 tablet; Refill: 3    I discussed with patient getting labs done today.  Discussed seeing him back in 3 months or sooner if needed.  Patient instructed to call with any questions or concerns.  His neck pain has been under control taking tizanidine.  We will continue current management.  Discussed continue current management of vitamin D deficiency as well.  He has resolved from COVID-19.    Follow Up   Return in about 3 months (around 4/24/2022) for migraine.  Patient was given instructions and counseling regarding his condition or for health maintenance advice. Please see specific information pulled into the AVS if appropriate.

## 2022-06-30 ENCOUNTER — OFFICE VISIT (OUTPATIENT)
Dept: FAMILY MEDICINE CLINIC | Facility: CLINIC | Age: 52
End: 2022-06-30

## 2022-06-30 VITALS
DIASTOLIC BLOOD PRESSURE: 78 MMHG | BODY MASS INDEX: 40.42 KG/M2 | OXYGEN SATURATION: 93 % | SYSTOLIC BLOOD PRESSURE: 134 MMHG | WEIGHT: 266.7 LBS | TEMPERATURE: 97.6 F | HEIGHT: 68 IN | HEART RATE: 104 BPM

## 2022-06-30 DIAGNOSIS — L25.5 RHUS DERMATITIS: Primary | ICD-10-CM

## 2022-06-30 PROCEDURE — 99213 OFFICE O/P EST LOW 20 MIN: CPT | Performed by: FAMILY MEDICINE

## 2022-06-30 PROCEDURE — 96372 THER/PROPH/DIAG INJ SC/IM: CPT | Performed by: FAMILY MEDICINE

## 2022-06-30 RX ORDER — PREDNISONE 20 MG/1
TABLET ORAL
Qty: 18 TABLET | Refills: 0 | Status: SHIPPED | OUTPATIENT
Start: 2022-06-30 | End: 2022-10-04

## 2022-06-30 RX ORDER — CLOBETASOL PROPIONATE 0.5 MG/G
1 CREAM TOPICAL 2 TIMES DAILY
Qty: 60 G | Refills: 0 | Status: SHIPPED | OUTPATIENT
Start: 2022-06-30 | End: 2022-12-05

## 2022-06-30 RX ORDER — TRIAMCINOLONE ACETONIDE 40 MG/ML
40 INJECTION, SUSPENSION INTRA-ARTICULAR; INTRAMUSCULAR ONCE
Status: COMPLETED | OUTPATIENT
Start: 2022-06-30 | End: 2022-06-30

## 2022-06-30 RX ADMIN — TRIAMCINOLONE ACETONIDE 40 MG: 40 INJECTION, SUSPENSION INTRA-ARTICULAR; INTRAMUSCULAR at 17:02

## 2022-06-30 NOTE — PROGRESS NOTES
"Chief Complaint  Rash from a vine    Subjective        Иван Kearney presents to [facility]  History of Present Illness  Patient presents for an acute visit.  Patient reports that he was doing yard work about 8 days ago and pulled on a Vine.  He reports that when the van broke and hit the left arm near the wrist area.  He reports that he got in contact with a vine on his legs.  Some of it has improved but some of it is persisting.  Patient presents today for further evaluation.  Objective   Vital Signs:  /78   Pulse 104   Temp 97.6 °F (36.4 °C)   Ht 172.7 cm (68\")   Wt 121 kg (266 lb 11.2 oz)   SpO2 93%   BMI 40.55 kg/m²   Estimated body mass index is 40.55 kg/m² as calculated from the following:    Height as of this encounter: 172.7 cm (68\").    Weight as of this encounter: 121 kg (266 lb 11.2 oz).          Physical Exam   General appearance: Pleasant, nonagitated, no acute distress  Skin: Erythematous rash noted on the lower extremities particularly on his calves.  These are vesicular and there is a linear component to this.  There is also vesicular linear rash on his volar aspect of the left wrist.  Result Review :                Assessment and Plan   Diagnoses and all orders for this visit:    1. Rhus dermatitis (Primary)  -     triamcinolone acetonide (KENALOG-40) injection 40 mg    Other orders  -     clobetasol (TEMOVATE) 0.05 % cream; Apply 1 application topically to the appropriate area as directed 2 (Two) Times a Day.  Dispense: 60 g; Refill: 0  -     predniSONE (DELTASONE) 20 MG tablet; Take 2 tablet PO daily x 5 days, then take 1 tablet PO daily x 5 days, then take 1/2 tablet PO daily x 6 days  Dispense: 18 tablet; Refill: 0    I discussed treatment for Geraldine dermatitis today.  I will give him a Kenalog injection and start him on a prednisone taper as well as give him clobetasol.  No signs of cellulitis today.  Patient instructed to call or return should " there be any worsening of symptoms or no improvement.         Follow Up   No follow-ups on file.  Patient was given instructions and counseling regarding his condition or for health maintenance advice. Please see specific information pulled into the AVS if appropriate.

## 2022-10-04 ENCOUNTER — OFFICE VISIT (OUTPATIENT)
Dept: FAMILY MEDICINE CLINIC | Facility: CLINIC | Age: 52
End: 2022-10-04

## 2022-10-04 VITALS
HEART RATE: 84 BPM | HEIGHT: 68 IN | SYSTOLIC BLOOD PRESSURE: 148 MMHG | DIASTOLIC BLOOD PRESSURE: 98 MMHG | OXYGEN SATURATION: 95 % | WEIGHT: 275.5 LBS | BODY MASS INDEX: 41.75 KG/M2 | TEMPERATURE: 97.9 F

## 2022-10-04 DIAGNOSIS — H53.9 VISION CHANGES: ICD-10-CM

## 2022-10-04 DIAGNOSIS — F43.10 PTSD (POST-TRAUMATIC STRESS DISORDER): ICD-10-CM

## 2022-10-04 DIAGNOSIS — F41.9 ANXIETY: ICD-10-CM

## 2022-10-04 DIAGNOSIS — R03.0 ELEVATED BP WITHOUT DIAGNOSIS OF HYPERTENSION: ICD-10-CM

## 2022-10-04 DIAGNOSIS — G43.809 OTHER MIGRAINE WITHOUT STATUS MIGRAINOSUS, NOT INTRACTABLE: Primary | ICD-10-CM

## 2022-10-04 PROCEDURE — 99213 OFFICE O/P EST LOW 20 MIN: CPT | Performed by: FAMILY MEDICINE

## 2022-10-04 RX ORDER — VENLAFAXINE HYDROCHLORIDE 37.5 MG/1
37.5 CAPSULE, EXTENDED RELEASE ORAL DAILY
Qty: 60 CAPSULE | Refills: 1 | Status: SHIPPED | OUTPATIENT
Start: 2022-10-04 | End: 2022-11-22

## 2022-10-04 NOTE — PROGRESS NOTES
"Chief Complaint  Migraine  Anxiety    Subjective        Иван Kearney presents to [facility]  History of Present Illness  Patient presents today to discuss migraine headaches.  I have previously discussed with him placing him on Topamax 25 mg twice daily.  He reports that about 2 and half months ago he was having a migraine headache every other day.  He reports the Topamax makes him too sleepy so he stopped taking it about a month ago.  He has about 6 or so migraine headaches a month.  He is interested in a preventative.  We also discussed today his blood pressure as it was elevated.  He reports that he has had borderline blood pressures previously.  He reports being irritable this morning as he had a PTSD related nightmares.  He is not currently taking any medication for PTSD.  He also reports that his anxiety has been more bothersome lately.  Objective   Vital Signs:  /98   Pulse 84   Temp 97.9 °F (36.6 °C)   Ht 172.7 cm (68\")   Wt 125 kg (275 lb 8 oz)   SpO2 95%   BMI 41.89 kg/m²   Estimated body mass index is 41.89 kg/m² as calculated from the following:    Height as of this encounter: 172.7 cm (68\").    Weight as of this encounter: 125 kg (275 lb 8 oz).          Physical Exam   General: AAO ×3, no acute distress, pleasant  HEENT: Normocephalic, atraumatic  Cardiovascular: Regular rate and rhythm without appreciable murmur  Respiratory: Clear to auscultation bilaterally no RRW  Gastrointestinal: Soft nontender nondistended with bowel sounds present  extremities: No edema  Neurologic: CN II through XII grossly intact   Psychiatric: Normal mood and affect  Result Review :                Assessment and Plan   Diagnoses and all orders for this visit:    1. Other migraine without status migrainosus, not intractable (Primary)    2. Anxiety    3. Vision changes  -     Ambulatory Referral to Ophthalmology    4. Elevated BP without diagnosis of hypertension    5. PTSD " (post-traumatic stress disorder)    Other orders  -     venlafaxine XR (Effexor XR) 37.5 MG 24 hr capsule; Take 1 capsule by mouth Daily.  Dispense: 60 capsule; Refill: 1    I discussed with patient starting him on Effexor to help out with anxiety, migraine prevention as well as with PTSD.  Plan to monitor his blood pressure for now.  He does have issues with vision changes and he is interested in LASIK eye surgery.  I did discuss with him that issues with his vision can certainly contribute to migraine headaches as well.  Patient does have corrective lenses but does not wear them regularly.         Follow Up   Return in about 2 months (around 12/4/2022) for migraine.  Patient was given instructions and counseling regarding his condition or for health maintenance advice. Please see specific information pulled into the AVS if appropriate.

## 2022-11-22 ENCOUNTER — OFFICE VISIT (OUTPATIENT)
Dept: FAMILY MEDICINE CLINIC | Facility: CLINIC | Age: 52
End: 2022-11-22

## 2022-11-22 VITALS
HEIGHT: 68 IN | DIASTOLIC BLOOD PRESSURE: 96 MMHG | HEART RATE: 89 BPM | SYSTOLIC BLOOD PRESSURE: 138 MMHG | BODY MASS INDEX: 42.65 KG/M2 | OXYGEN SATURATION: 95 % | TEMPERATURE: 97.5 F | WEIGHT: 281.4 LBS

## 2022-11-22 DIAGNOSIS — H35.369 DRUSEN: ICD-10-CM

## 2022-11-22 DIAGNOSIS — Z85.828 HISTORY OF SKIN CANCER: ICD-10-CM

## 2022-11-22 DIAGNOSIS — L98.9 SKIN LESIONS: ICD-10-CM

## 2022-11-22 DIAGNOSIS — F41.9 ANXIETY: ICD-10-CM

## 2022-11-22 DIAGNOSIS — I10 PRIMARY HYPERTENSION: ICD-10-CM

## 2022-11-22 DIAGNOSIS — G43.809 OTHER MIGRAINE WITHOUT STATUS MIGRAINOSUS, NOT INTRACTABLE: Primary | ICD-10-CM

## 2022-11-22 PROCEDURE — 99213 OFFICE O/P EST LOW 20 MIN: CPT | Performed by: FAMILY MEDICINE

## 2022-11-22 RX ORDER — METOPROLOL SUCCINATE 25 MG/1
25 TABLET, EXTENDED RELEASE ORAL DAILY
Qty: 60 TABLET | Refills: 0 | Status: SHIPPED | OUTPATIENT
Start: 2022-11-22 | End: 2023-02-07 | Stop reason: SDUPTHER

## 2022-11-22 RX ORDER — BUSPIRONE HYDROCHLORIDE 10 MG/1
10 TABLET ORAL 2 TIMES DAILY
Qty: 60 TABLET | Refills: 1 | Status: SHIPPED | OUTPATIENT
Start: 2022-11-22 | End: 2023-02-07

## 2022-11-22 RX ORDER — CARBOXYMETHYLCELLULOSE SODIUM 5 MG/ML
SOLUTION/ DROPS OPHTHALMIC
COMMUNITY
Start: 2022-11-16

## 2022-11-22 RX ORDER — BRIMONIDINE TARTRATE 0.15 %
DROPS OPHTHALMIC (EYE)
COMMUNITY
Start: 2022-11-16

## 2022-11-22 NOTE — PROGRESS NOTES
"Chief Complaint  Migraine   BP  Vision changes      Subjective        Иван Kearney presents to [facility]  History of Present Illness  Patient presents today to follow-up for migraine headaches.  He was started on Effexor on last visit to help out with anxiety, migraine headaches as well as PTSD.  He reports that he Effexor made him feel hung over.  He does not like the way it made him feel and he took it for 10 days and continued to have symptoms of this medication was discontinued on his own.  He continues to have migraine headaches about once every other day.  He has previously been on propranolol which he reports caused him to gain weight.  He did not tolerate Topamax.  I discussed with patient trial of metoprolol.  His blood pressure is elevated again today.  I discussed that this will be beneficial in treating hypertension as well.  For anxiety we discussed a trial of BuSpar.  I plan to see him back for close follow-up.  He already has an appointment on 12/5/2022.  He did see ophthalmology.  He was diagnosed with drusen of the optic nerve bilaterally as well as having bilateral cataract.  He does need a new prescription for his vision.  Patient needs to see dermatology again.  He has a history of prior skin cancer.  He has some moles that he needs to have looked at and cut off as well as some other skin issues.  He is requesting a local referral.  Objective   Vital Signs:  /96   Pulse 89   Temp 97.5 °F (36.4 °C)   Ht 172.7 cm (68\")   Wt 128 kg (281 lb 6.4 oz)   SpO2 95%   BMI 42.79 kg/m²   Estimated body mass index is 42.79 kg/m² as calculated from the following:    Height as of this encounter: 172.7 cm (68\").    Weight as of this encounter: 128 kg (281 lb 6.4 oz).          Physical Exam   General: AAO ×3, no acute distress, pleasant  HEENT: Normocephalic, atraumatic  Cardiovascular: Regular rate and rhythm without appreciable murmur  Respiratory: Clear to " auscultation bilaterally no RRW  Gastrointestinal: Soft nontender nondistended with bowel sounds present  extremities: No edema  Neurologic: CN II through XII grossly intact   Psychiatric: Normal mood and affect  Result Review :                Assessment and Plan   Diagnoses and all orders for this visit:    1. Other migraine without status migrainosus, not intractable (Primary)    2. Drusen, bilateral    3. Anxiety    4. History of skin cancer  -     Ambulatory Referral to Dermatology    5. Skin lesions  -     Ambulatory Referral to Dermatology    6. Primary hypertension    Other orders  -     metoprolol succinate XL (Toprol XL) 25 MG 24 hr tablet; Take 1 tablet by mouth Daily.  Dispense: 60 tablet; Refill: 0  -     busPIRone (BUSPAR) 10 MG tablet; Take 1 tablet by mouth 2 (Two) Times a Day.  Dispense: 60 tablet; Refill: 1      I discussed with patient and a referral to dermatology.  We discussed starting metoprolol to help out with migraines as well as with his blood pressure.  I will place him on BuSpar for anxiety.  Plan to see patient back for his next regular scheduled appointment in a couple weeks.       Follow Up   Return for keep next appointment.  Patient was given instructions and counseling regarding his condition or for health maintenance advice. Please see specific information pulled into the AVS if appropriate.

## 2022-12-05 ENCOUNTER — OFFICE VISIT (OUTPATIENT)
Dept: FAMILY MEDICINE CLINIC | Facility: CLINIC | Age: 52
End: 2022-12-05

## 2022-12-05 VITALS
HEART RATE: 82 BPM | SYSTOLIC BLOOD PRESSURE: 132 MMHG | OXYGEN SATURATION: 94 % | BODY MASS INDEX: 43.26 KG/M2 | HEIGHT: 68 IN | WEIGHT: 285.4 LBS | TEMPERATURE: 97.7 F | DIASTOLIC BLOOD PRESSURE: 90 MMHG

## 2022-12-05 DIAGNOSIS — E55.9 VITAMIN D DEFICIENCY: ICD-10-CM

## 2022-12-05 DIAGNOSIS — Z12.5 SCREENING FOR PROSTATE CANCER: ICD-10-CM

## 2022-12-05 DIAGNOSIS — G43.809 OTHER MIGRAINE WITHOUT STATUS MIGRAINOSUS, NOT INTRACTABLE: Primary | ICD-10-CM

## 2022-12-05 DIAGNOSIS — R73.03 PREDIABETES: ICD-10-CM

## 2022-12-05 DIAGNOSIS — I10 PRIMARY HYPERTENSION: ICD-10-CM

## 2022-12-05 DIAGNOSIS — E78.00 PURE HYPERCHOLESTEROLEMIA: ICD-10-CM

## 2022-12-05 DIAGNOSIS — Z51.81 MEDICATION MONITORING ENCOUNTER: ICD-10-CM

## 2022-12-05 DIAGNOSIS — F41.9 ANXIETY: ICD-10-CM

## 2022-12-05 PROCEDURE — 99214 OFFICE O/P EST MOD 30 MIN: CPT | Performed by: FAMILY MEDICINE

## 2022-12-05 NOTE — PROGRESS NOTES
"Chief Complaint  Migraines  Anxiety    Subjective        Иван Kearney presents to [facility]  History of Present Illness  Patient presents today to follow-up for migraine headaches.  He states that the metoprolol has been working better for him.  He has had a reduced frequency of migraine headaches and reports having had about 4 since I last saw him.  He does take the Imitrex which helps out.  He has previously been on trials of other migraine medications including propranolol, Topamax, and Effexor but he had side effects with these medications and he did not receive adequate relief.  I did discuss with him the option to continue propranolol at this time but also to potentially consider a trial of Emgality.  He would like to hold off for now and continue with metoprolol.  His blood pressure is lower today as well so we will continue with the metoprolol for this reason as well.  For anxiety he was given BuSpar but he reports it makes him very sleepy.  He has taken 10 mg but only once a day not twice a day.  I did discuss with him alternative options including lowering the dose to 5 mg which he is agreeable to trying.  He will be due for labs when he returns for his follow-up appointment.  Plan to check a lipid profile.  His last LDL was 169.  He is not currently on a statin.  Plan to also check a vitamin D as well as an A1c.  He was previously noted to be in the prediabetic range at 6.37 with his A1c.  Objective   Vital Signs:  /90   Pulse 82   Temp 97.7 °F (36.5 °C)   Ht 172.7 cm (68\")   Wt 129 kg (285 lb 6.4 oz)   SpO2 94%   BMI 43.39 kg/m²   Estimated body mass index is 43.39 kg/m² as calculated from the following:    Height as of this encounter: 172.7 cm (68\").    Weight as of this encounter: 129 kg (285 lb 6.4 oz).          Physical Exam   General: AAO ×3, no acute distress, pleasant  HEENT: Normocephalic, atraumatic  Cardiovascular: Regular rate and rhythm " without appreciable murmur  Respiratory: Clear to auscultation bilaterally no RRW  Gastrointestinal: Soft nontender nondistended with bowel sounds present  extremities: No edema  Neurologic: CN II through XII grossly intact   Psychiatric: Normal mood and affect  Result Review :                Assessment and Plan   Diagnoses and all orders for this visit:    1. Other migraine without status migrainosus, not intractable (Primary)    2. Anxiety    3. Primary hypertension  -     CBC & Differential; Future  -     Comprehensive Metabolic Panel; Future    4. Vitamin D deficiency  -     Vitamin D,25-Hydroxy; Future    5. Prediabetes  -     Hemoglobin A1c; Future    6. Medication monitoring encounter  -     CBC & Differential; Future  -     Comprehensive Metabolic Panel; Future  -     PSA Screen; Future  -     Hemoglobin A1c; Future  -     Lipid Panel; Future  -     Vitamin D,25-Hydroxy; Future    7. Pure hypercholesterolemia  -     Lipid Panel; Future    8. Screening for prostate cancer  -     PSA Screen; Future    I discussed with patient reducing the dose of BuSpar to take half a tablet or 5 mg twice a day as needed.  We discussed continuing with metoprolol for migraine headaches.  This will also be beneficial for hypertension.  I plan to see him back in 2 months or sooner if needed.  He is instructed to call with any questions or concerns.         Follow Up   Return in about 2 months (around 2/5/2023) for migraines.  Patient was given instructions and counseling regarding his condition or for health maintenance advice. Please see specific information pulled into the AVS if appropriate.

## 2023-02-07 ENCOUNTER — OFFICE VISIT (OUTPATIENT)
Dept: FAMILY MEDICINE CLINIC | Facility: CLINIC | Age: 53
End: 2023-02-07
Payer: MEDICARE

## 2023-02-07 VITALS
HEART RATE: 83 BPM | SYSTOLIC BLOOD PRESSURE: 132 MMHG | HEIGHT: 68 IN | DIASTOLIC BLOOD PRESSURE: 92 MMHG | OXYGEN SATURATION: 95 % | TEMPERATURE: 97.5 F | BODY MASS INDEX: 43.3 KG/M2 | WEIGHT: 285.7 LBS

## 2023-02-07 DIAGNOSIS — E78.00 PURE HYPERCHOLESTEROLEMIA: ICD-10-CM

## 2023-02-07 DIAGNOSIS — Z11.59 NEED FOR HEPATITIS C SCREENING TEST: ICD-10-CM

## 2023-02-07 DIAGNOSIS — Z51.81 MEDICATION MONITORING ENCOUNTER: ICD-10-CM

## 2023-02-07 DIAGNOSIS — E55.9 VITAMIN D DEFICIENCY: ICD-10-CM

## 2023-02-07 DIAGNOSIS — Z23 NEED FOR TDAP VACCINATION: ICD-10-CM

## 2023-02-07 DIAGNOSIS — F41.9 ANXIETY: ICD-10-CM

## 2023-02-07 DIAGNOSIS — R73.03 PREDIABETES: ICD-10-CM

## 2023-02-07 DIAGNOSIS — G43.809 OTHER MIGRAINE WITHOUT STATUS MIGRAINOSUS, NOT INTRACTABLE: Primary | ICD-10-CM

## 2023-02-07 PROCEDURE — 90471 IMMUNIZATION ADMIN: CPT | Performed by: FAMILY MEDICINE

## 2023-02-07 PROCEDURE — 90715 TDAP VACCINE 7 YRS/> IM: CPT | Performed by: FAMILY MEDICINE

## 2023-02-07 PROCEDURE — 99214 OFFICE O/P EST MOD 30 MIN: CPT | Performed by: FAMILY MEDICINE

## 2023-02-07 RX ORDER — METOPROLOL SUCCINATE 25 MG/1
25 TABLET, EXTENDED RELEASE ORAL DAILY
Qty: 90 TABLET | Refills: 1 | Status: SHIPPED | OUTPATIENT
Start: 2023-02-07

## 2023-02-07 RX ORDER — BUSPIRONE HYDROCHLORIDE 10 MG/1
5 TABLET ORAL 2 TIMES DAILY
Qty: 60 TABLET | Refills: 1 | Status: SHIPPED
Start: 2023-02-07

## 2023-02-07 NOTE — PROGRESS NOTES
"Chief Complaint  Migraines    Subjective        Иван Kearney presents to [facility]  History of Present Illness  Patient presents today to follow-up for migraine headaches.  He is taking metoprolol succinate 25 mg daily.  He has been doing much better on this regimen.  He does describe having some issues with migraines related to the weather changes.  He has had about 8 or 9 migraine headaches in the past month.  He reports significant improvement.  I did discuss with him that he still having quite a few but he would like to wait until the weather changes to see if the slowdown.  I did discuss with him the option increase metoprolol to take 50 mg today but he would like to hold off.  Anxiety has been under better control as well as his side effects from BuSpar taking a half a tablet of the 10 mg, or 5 mg twice a day.  He is due for labs so we discussed having him come back at his convenience to have these done.  He is due for a Tdap vaccination so we will get this done.  He quit smoking in 2014.  He smoked less than 20-pack-year history.  Objective   Vital Signs:  /92 (BP Location: Left arm, Patient Position: Sitting)   Pulse 83   Temp 97.5 °F (36.4 °C) (Oral)   Ht 172.7 cm (68\")   Wt 130 kg (285 lb 11.2 oz)   SpO2 95%   BMI 43.44 kg/m²   Estimated body mass index is 43.44 kg/m² as calculated from the following:    Height as of this encounter: 172.7 cm (68\").    Weight as of this encounter: 130 kg (285 lb 11.2 oz).             Physical Exam   General: AAO ×3, no acute distress, pleasant  HEENT: Normocephalic, atraumatic  Cardiovascular: Regular rate and rhythm without appreciable murmur  Respiratory: Clear to auscultation bilaterally no RRW  Gastrointestinal: Soft nontender nondistended with bowel sounds present  extremities: No edema  Neurologic: CN II through XII grossly intact   Psychiatric: Normal mood and affect  Result Review :                   Assessment " and Plan   Diagnoses and all orders for this visit:    1. Other migraine without status migrainosus, not intractable (Primary)    2. Anxiety    3. Prediabetes    4. Vitamin D deficiency    5. Medication monitoring encounter    6. Pure hypercholesterolemia    7. Need for Tdap vaccination    8. Need for hepatitis C screening test  -     Hepatitis C Antibody; Future    Other orders  -     busPIRone (BUSPAR) 10 MG tablet; Take 0.5 tablets by mouth 2 (Two) Times a Day.  Dispense: 60 tablet; Refill: 1  -     metoprolol succinate XL (Toprol XL) 25 MG 24 hr tablet; Take 1 tablet by mouth Daily.  Dispense: 90 tablet; Refill: 1    Plan as documented above.  We discussed continue current management for migraine headaches as well as anxiety.  Plan to have labs drawn at his convenience fasting.  I plan to see him back in 3 months or sooner if needed.  He is instructed to call with any questions or concerns.         Follow Up   Return in about 3 months (around 5/7/2023) for migraine.  Patient was given instructions and counseling regarding his condition or for health maintenance advice. Please see specific information pulled into the AVS if appropriate.

## 2023-05-16 ENCOUNTER — CLINICAL SUPPORT (OUTPATIENT)
Dept: FAMILY MEDICINE CLINIC | Facility: CLINIC | Age: 53
End: 2023-05-16
Payer: MEDICARE

## 2023-05-16 DIAGNOSIS — Z11.59 NEED FOR HEPATITIS C SCREENING TEST: ICD-10-CM

## 2023-05-16 DIAGNOSIS — Z51.81 MEDICATION MONITORING ENCOUNTER: ICD-10-CM

## 2023-05-16 DIAGNOSIS — Z12.5 SCREENING FOR PROSTATE CANCER: ICD-10-CM

## 2023-05-16 DIAGNOSIS — E78.00 PURE HYPERCHOLESTEROLEMIA: ICD-10-CM

## 2023-05-16 DIAGNOSIS — E55.9 VITAMIN D DEFICIENCY: ICD-10-CM

## 2023-05-16 DIAGNOSIS — R73.03 PREDIABETES: ICD-10-CM

## 2023-05-16 DIAGNOSIS — I10 PRIMARY HYPERTENSION: ICD-10-CM

## 2023-05-16 LAB
25(OH)D3 SERPL-MCNC: 39.4 NG/ML (ref 30–100)
ALBUMIN SERPL-MCNC: 4.4 G/DL (ref 3.5–5.2)
ALBUMIN/GLOB SERPL: 1.8 G/DL
ALP SERPL-CCNC: 69 U/L (ref 39–117)
ALT SERPL W P-5'-P-CCNC: 28 U/L (ref 1–41)
ANION GAP SERPL CALCULATED.3IONS-SCNC: 12.1 MMOL/L (ref 5–15)
AST SERPL-CCNC: 29 U/L (ref 1–40)
BASOPHILS # BLD AUTO: 0.03 10*3/MM3 (ref 0–0.2)
BASOPHILS NFR BLD AUTO: 0.5 % (ref 0–1.5)
BILIRUB SERPL-MCNC: 0.5 MG/DL (ref 0–1.2)
BUN SERPL-MCNC: 12 MG/DL (ref 6–20)
BUN/CREAT SERPL: 9.7 (ref 7–25)
CALCIUM SPEC-SCNC: 9.5 MG/DL (ref 8.6–10.5)
CHLORIDE SERPL-SCNC: 105 MMOL/L (ref 98–107)
CHOLEST SERPL-MCNC: 219 MG/DL (ref 0–200)
CO2 SERPL-SCNC: 24.9 MMOL/L (ref 22–29)
CREAT SERPL-MCNC: 1.24 MG/DL (ref 0.76–1.27)
DEPRECATED RDW RBC AUTO: 39.7 FL (ref 37–54)
EGFRCR SERPLBLD CKD-EPI 2021: 69.1 ML/MIN/1.73
EOSINOPHIL # BLD AUTO: 0.15 10*3/MM3 (ref 0–0.4)
EOSINOPHIL NFR BLD AUTO: 2.5 % (ref 0.3–6.2)
ERYTHROCYTE [DISTWIDTH] IN BLOOD BY AUTOMATED COUNT: 12.8 % (ref 12.3–15.4)
GLOBULIN UR ELPH-MCNC: 2.4 GM/DL
GLUCOSE SERPL-MCNC: 87 MG/DL (ref 65–99)
HBA1C MFR BLD: 5.6 % (ref 4.8–5.6)
HCT VFR BLD AUTO: 44.9 % (ref 37.5–51)
HCV AB SER DONR QL: NORMAL
HDLC SERPL-MCNC: 61 MG/DL (ref 40–60)
HGB BLD-MCNC: 15.6 G/DL (ref 13–17.7)
IMM GRANULOCYTES # BLD AUTO: 0.02 10*3/MM3 (ref 0–0.05)
IMM GRANULOCYTES NFR BLD AUTO: 0.3 % (ref 0–0.5)
LDLC SERPL CALC-MCNC: 148 MG/DL (ref 0–100)
LDLC/HDLC SERPL: 2.4 {RATIO}
LYMPHOCYTES # BLD AUTO: 2.34 10*3/MM3 (ref 0.7–3.1)
LYMPHOCYTES NFR BLD AUTO: 38.2 % (ref 19.6–45.3)
MCH RBC QN AUTO: 30 PG (ref 26.6–33)
MCHC RBC AUTO-ENTMCNC: 34.7 G/DL (ref 31.5–35.7)
MCV RBC AUTO: 86.3 FL (ref 79–97)
MONOCYTES # BLD AUTO: 0.53 10*3/MM3 (ref 0.1–0.9)
MONOCYTES NFR BLD AUTO: 8.7 % (ref 5–12)
NEUTROPHILS NFR BLD AUTO: 3.05 10*3/MM3 (ref 1.7–7)
NEUTROPHILS NFR BLD AUTO: 49.8 % (ref 42.7–76)
NRBC BLD AUTO-RTO: 0 /100 WBC (ref 0–0.2)
PLATELET # BLD AUTO: 299 10*3/MM3 (ref 140–450)
PMV BLD AUTO: 11 FL (ref 6–12)
POTASSIUM SERPL-SCNC: 4.1 MMOL/L (ref 3.5–5.2)
PROT SERPL-MCNC: 6.8 G/DL (ref 6–8.5)
PSA SERPL-MCNC: 1.09 NG/ML (ref 0–4)
RBC # BLD AUTO: 5.2 10*6/MM3 (ref 4.14–5.8)
SODIUM SERPL-SCNC: 142 MMOL/L (ref 136–145)
TRIGL SERPL-MCNC: 57 MG/DL (ref 0–150)
VLDLC SERPL-MCNC: 10 MG/DL (ref 5–40)
WBC NRBC COR # BLD: 6.12 10*3/MM3 (ref 3.4–10.8)

## 2023-05-16 PROCEDURE — 85025 COMPLETE CBC W/AUTO DIFF WBC: CPT | Performed by: FAMILY MEDICINE

## 2023-05-16 PROCEDURE — 83036 HEMOGLOBIN GLYCOSYLATED A1C: CPT | Performed by: FAMILY MEDICINE

## 2023-05-16 PROCEDURE — 82306 VITAMIN D 25 HYDROXY: CPT | Performed by: FAMILY MEDICINE

## 2023-05-16 PROCEDURE — 80053 COMPREHEN METABOLIC PANEL: CPT | Performed by: FAMILY MEDICINE

## 2023-05-16 PROCEDURE — 86803 HEPATITIS C AB TEST: CPT | Performed by: FAMILY MEDICINE

## 2023-05-16 PROCEDURE — 36415 COLL VENOUS BLD VENIPUNCTURE: CPT | Performed by: FAMILY MEDICINE

## 2023-05-16 PROCEDURE — G0103 PSA SCREENING: HCPCS | Performed by: FAMILY MEDICINE

## 2023-05-16 PROCEDURE — 80061 LIPID PANEL: CPT | Performed by: FAMILY MEDICINE

## 2023-05-31 ENCOUNTER — OFFICE VISIT (OUTPATIENT)
Dept: FAMILY MEDICINE CLINIC | Facility: CLINIC | Age: 53
End: 2023-05-31

## 2023-05-31 VITALS
HEIGHT: 68 IN | SYSTOLIC BLOOD PRESSURE: 148 MMHG | TEMPERATURE: 97.9 F | WEIGHT: 285.1 LBS | DIASTOLIC BLOOD PRESSURE: 92 MMHG | OXYGEN SATURATION: 95 % | BODY MASS INDEX: 43.21 KG/M2 | HEART RATE: 93 BPM

## 2023-05-31 DIAGNOSIS — Z51.81 MEDICATION MONITORING ENCOUNTER: ICD-10-CM

## 2023-05-31 DIAGNOSIS — M72.2 PLANTAR FASCIITIS, LEFT: ICD-10-CM

## 2023-05-31 DIAGNOSIS — E78.00 PURE HYPERCHOLESTEROLEMIA: ICD-10-CM

## 2023-05-31 DIAGNOSIS — G43.809 OTHER MIGRAINE WITHOUT STATUS MIGRAINOSUS, NOT INTRACTABLE: Primary | ICD-10-CM

## 2023-05-31 DIAGNOSIS — F41.9 ANXIETY: ICD-10-CM

## 2023-05-31 DIAGNOSIS — R73.03 PREDIABETES: ICD-10-CM

## 2023-05-31 DIAGNOSIS — M72.2 PLANTAR FASCIITIS, RIGHT: ICD-10-CM

## 2023-05-31 DIAGNOSIS — I10 PRIMARY HYPERTENSION: ICD-10-CM

## 2023-05-31 DIAGNOSIS — E55.9 VITAMIN D DEFICIENCY: ICD-10-CM

## 2023-05-31 RX ORDER — SUMATRIPTAN 100 MG/1
100 TABLET, FILM COATED ORAL ONCE AS NEEDED
Qty: 30 TABLET | Refills: 3 | Status: SHIPPED | OUTPATIENT
Start: 2023-05-31

## 2023-05-31 RX ORDER — BUSPIRONE HYDROCHLORIDE 10 MG/1
5 TABLET ORAL 2 TIMES DAILY
Qty: 90 TABLET | Refills: 1 | Status: SHIPPED | OUTPATIENT
Start: 2023-05-31

## 2023-05-31 RX ORDER — ERGOCALCIFEROL 1.25 MG/1
50000 CAPSULE ORAL
Qty: 13 CAPSULE | Refills: 3 | Status: SHIPPED | OUTPATIENT
Start: 2023-05-31

## 2023-05-31 RX ORDER — TIZANIDINE 4 MG/1
4 TABLET ORAL EVERY 8 HOURS PRN
Qty: 90 TABLET | Refills: 1 | Status: SHIPPED | OUTPATIENT
Start: 2023-05-31

## 2023-05-31 RX ORDER — METOPROLOL SUCCINATE 25 MG/1
25 TABLET, EXTENDED RELEASE ORAL DAILY
Qty: 90 TABLET | Refills: 3 | Status: SHIPPED | OUTPATIENT
Start: 2023-05-31

## 2023-05-31 NOTE — PROGRESS NOTES
"Chief Complaint  Migraines  Hypertension  Prediabetes  Bilateral foot pain    Subjective        Иван Kearney presents to [facility]  History of Present Illness  Patient presents today to follow-up for migraines, hypertension and prediabetes.  He had labs drawn prior to the appointment.  He tested negative for hepatitis C.  His vitamin D levels are adequate.  He continues to take vitamin D 50,000 units once weekly.  His lipid panel showed his LDL at 148.  We discussed adding a statin today but he would like to hold off at this time.  He would like to work on lifestyle changes including diet and exercise.  His A1c has improved and is now 5.6%.  He was previously in the prediabetic range but again this has improved.  His PSA level is within normal limits.  His CMP shows normal renal function with normal LFTs.  CBC shows no anemia with normal white blood cell count and platelets.  His migraine headaches have been under control taking metoprolol.  He is also taking metoprolol to help out with hypertension.  His blood pressure is elevated today but he reports that he was recently on a road trip and took in a lot of caffeine.  I have encouraged him to check his blood pressure at home and to bring a log on the next visit.  His anxiety symptoms have been manageable taking BuSpar.  We discussed continuing current management at this time.  He does have bilateral foot pain right is worse than left for the past few months.  It hurts especially with the first few steps after he gets up.  He points to the base of the calcaneus on the plantar aspect of both feet.  Objective   Vital Signs:  /92   Pulse 93   Temp 97.9 °F (36.6 °C)   Ht 172.7 cm (68\")   Wt 129 kg (285 lb 1.6 oz)   SpO2 95%   BMI 43.35 kg/m²   Estimated body mass index is 43.35 kg/m² as calculated from the following:    Height as of this encounter: 172.7 cm (68\").    Weight as of this encounter: 129 kg (285 lb 1.6 " oz).             Physical Exam   General: AAO ×3, no acute distress, pleasant  HEENT: Normocephalic, atraumatic  Cardiovascular: Regular rate and rhythm without appreciable murmur  Respiratory: Clear to auscultation bilaterally no RRW  Gastrointestinal: Soft nontender nondistended with bowel sounds present  Musculoskeletal: Reproducible tenderness to palpation over the plantar aspect of both feet near the calcaneus.  extremities: No edema  Neurologic: CN II through XII grossly intact   Psychiatric: Normal mood and affect  Result Review :                   Assessment and Plan   Diagnoses and all orders for this visit:    1. Other migraine without status migrainosus, not intractable (Primary)    2. Anxiety    3. Plantar fasciitis, left    4. Plantar fasciitis, right    5. Vitamin D deficiency    6. Pure hypercholesterolemia    7. Prediabetes    8. Primary hypertension    9. Medication monitoring encounter    Other orders  -     busPIRone (BUSPAR) 10 MG tablet; Take 0.5 tablets by mouth 2 (Two) Times a Day.  Dispense: 90 tablet; Refill: 1  -     metoprolol succinate XL (Toprol XL) 25 MG 24 hr tablet; Take 1 tablet by mouth Daily.  Dispense: 90 tablet; Refill: 3  -     Diclofenac Sodium (VOLTAREN) 1 % gel gel; Apply 4 g topically to the appropriate area as directed 4 (Four) Times a Day As Needed (neck pain).  Dispense: 100 g; Refill: 1  -     SUMAtriptan (IMITREX) 100 MG tablet; Take 1 tablet by mouth 1 (One) Time As Needed for Migraine.  Dispense: 30 tablet; Refill: 3  -     vitamin D (ERGOCALCIFEROL) 1.25 MG (95657 UT) capsule capsule; Take 1 capsule by mouth Every 7 (Seven) Days.  Dispense: 13 capsule; Refill: 3  -     tiZANidine (ZANAFLEX) 4 MG tablet; Take 1 tablet by mouth Every 8 (Eight) Hours As Needed for Muscle Spasms.  Dispense: 90 tablet; Refill: 1    I discussed with patient diagnosis and treatment for plantar fasciitis.  I discussed with him conservative measures at this time including heel cups, stretches  and exercises.  Should symptoms continue to persist we discussed the option of a corticosteroid injection.  We discussed continuing current management for migraine headaches as well as vitamin D deficiency.  I plan to see him back in 2 months for follow-up.  Patient instructed to call with any questions or concerns.         Follow Up   Return in about 2 months (around 7/31/2023) for plantar fasciitis.  Patient was given instructions and counseling regarding his condition or for health maintenance advice. Please see specific information pulled into the AVS if appropriate.

## 2023-08-03 ENCOUNTER — OFFICE VISIT (OUTPATIENT)
Dept: FAMILY MEDICINE CLINIC | Facility: CLINIC | Age: 53
End: 2023-08-03
Payer: MEDICARE

## 2023-08-03 VITALS
TEMPERATURE: 97.9 F | WEIGHT: 283 LBS | SYSTOLIC BLOOD PRESSURE: 140 MMHG | DIASTOLIC BLOOD PRESSURE: 88 MMHG | BODY MASS INDEX: 42.89 KG/M2 | OXYGEN SATURATION: 97 % | HEIGHT: 68 IN | HEART RATE: 84 BPM

## 2023-08-03 DIAGNOSIS — G43.809 OTHER MIGRAINE WITHOUT STATUS MIGRAINOSUS, NOT INTRACTABLE: ICD-10-CM

## 2023-08-03 DIAGNOSIS — I10 PRIMARY HYPERTENSION: Primary | ICD-10-CM

## 2023-08-03 DIAGNOSIS — M72.2 PLANTAR FASCIITIS, RIGHT: ICD-10-CM

## 2023-08-03 DIAGNOSIS — K64.4 EXTERNAL HEMORRHOID: ICD-10-CM

## 2023-08-03 DIAGNOSIS — F41.9 ANXIETY: ICD-10-CM

## 2023-08-03 DIAGNOSIS — M72.2 PLANTAR FASCIITIS, LEFT: ICD-10-CM

## 2023-08-03 PROBLEM — Z12.11 SCREENING FOR COLON CANCER: Status: ACTIVE | Noted: 2023-08-03

## 2023-08-03 PROCEDURE — 99214 OFFICE O/P EST MOD 30 MIN: CPT | Performed by: FAMILY MEDICINE

## 2023-08-03 RX ORDER — TIZANIDINE 4 MG/1
4 TABLET ORAL EVERY 8 HOURS PRN
Qty: 90 TABLET | Refills: 1 | Status: SHIPPED | OUTPATIENT
Start: 2023-08-03

## 2023-08-03 RX ORDER — BUSPIRONE HYDROCHLORIDE 10 MG/1
5 TABLET ORAL 2 TIMES DAILY
Qty: 90 TABLET | Refills: 1 | Status: SHIPPED | OUTPATIENT
Start: 2023-08-03

## 2023-08-03 RX ORDER — HYDROCORTISONE 25 MG/G
CREAM TOPICAL 2 TIMES DAILY
Qty: 28 G | Refills: 1 | Status: SHIPPED | OUTPATIENT
Start: 2023-08-03

## 2023-12-04 ENCOUNTER — OFFICE VISIT (OUTPATIENT)
Dept: FAMILY MEDICINE CLINIC | Facility: CLINIC | Age: 53
End: 2023-12-04
Payer: MEDICARE

## 2023-12-04 VITALS
OXYGEN SATURATION: 96 % | DIASTOLIC BLOOD PRESSURE: 92 MMHG | HEIGHT: 68 IN | BODY MASS INDEX: 41.82 KG/M2 | SYSTOLIC BLOOD PRESSURE: 152 MMHG | HEART RATE: 81 BPM | WEIGHT: 275.9 LBS | TEMPERATURE: 98.2 F

## 2023-12-04 DIAGNOSIS — F41.9 ANXIETY: ICD-10-CM

## 2023-12-04 DIAGNOSIS — I10 PRIMARY HYPERTENSION: Primary | ICD-10-CM

## 2023-12-04 DIAGNOSIS — G43.809 OTHER MIGRAINE WITHOUT STATUS MIGRAINOSUS, NOT INTRACTABLE: ICD-10-CM

## 2023-12-04 DIAGNOSIS — M72.2 PLANTAR FASCIITIS, RIGHT: ICD-10-CM

## 2023-12-04 DIAGNOSIS — R10.9 RIGHT FLANK PAIN: ICD-10-CM

## 2023-12-04 DIAGNOSIS — K64.4 EXTERNAL HEMORRHOID: ICD-10-CM

## 2023-12-04 DIAGNOSIS — Z87.442 HISTORY OF NEPHROLITHIASIS: ICD-10-CM

## 2023-12-04 DIAGNOSIS — M72.2 PLANTAR FASCIITIS, LEFT: ICD-10-CM

## 2023-12-04 LAB
BILIRUB UR QL STRIP: NEGATIVE
CLARITY UR: CLEAR
COLOR UR: YELLOW
GLUCOSE UR STRIP-MCNC: NEGATIVE MG/DL
HGB UR QL STRIP.AUTO: NEGATIVE
KETONES UR QL STRIP: NEGATIVE
LEUKOCYTE ESTERASE UR QL STRIP.AUTO: NEGATIVE
NITRITE UR QL STRIP: NEGATIVE
PH UR STRIP.AUTO: 6 [PH] (ref 5–8)
PROT UR QL STRIP: NEGATIVE
SP GR UR STRIP: 1.01 (ref 1–1.03)
UROBILINOGEN UR QL STRIP: NORMAL

## 2023-12-04 PROCEDURE — 99214 OFFICE O/P EST MOD 30 MIN: CPT | Performed by: FAMILY MEDICINE

## 2023-12-04 PROCEDURE — 81003 URINALYSIS AUTO W/O SCOPE: CPT | Performed by: FAMILY MEDICINE

## 2023-12-04 NOTE — PROGRESS NOTES
Chief Complaint  Migraines  Hypertension  Hemorrhoids      Subjective        Иван Kearney presents to [facility]  History of Present Illness  Patient presents today to follow-up for migraine headaches.  Symptoms have been under control since taking metoprolol.  He also follows up for hypertension.  He does not check his blood pressure at home.  I did encourage him to do so again today.  He does take metoprolol 25 mg daily for hypertension as well as treatment for migraine headaches.  He is encouraged to check his blood pressure at home and then we can make adjustments as needed.  He is reluctant to start a medication in addition to metoprolol for hypertension today.  Anxiety for the most part has been under control taking BuSpar 5 mg twice daily.  He continues to have issues with plantar fasciitis.  Both sides bother him about equally.  We previously discussed a steroid injection but he would like to hold off again today.  We previously discussed referral to podiatry but again he would like to hold off today.  He previously had mention that he would do some research first before picking a podiatrist.  I did offer a steroid injection today but he would like to hold off.  He does have an issue with the hemorrhoid that has been bothering him for the past several years.  I had previously given him hydrocortisone HC which does help out but he is ready to have a more definitive management with excision per general surgery.  I did discuss placing referral today.  He thinks that he has a kidney stone that has come back.  We previously discussed this back in 2020.  He saw Dr. Dior and had laser lithotripsy done.  He had right-sided obstructive uropathy.  He was also noted to have a nonobstructing left nephrolithiasis at the lower pole measuring 6 mm.  He had the right 1 removed as it did measure 1.3 cm.  He reports that a couple weeks ago he had right flank pain which is since  "resolved.  We did discuss giving a urine sample today for further evaluation.  He is asymptomatic at this time.  We did discuss considering further evaluation depending on clinical course as well as findings of his urinalysis.  Objective   Vital Signs:  /92   Pulse 81   Temp 98.2 °F (36.8 °C)   Ht 172.7 cm (68\")   Wt 125 kg (275 lb 14.4 oz)   SpO2 96%   BMI 41.95 kg/m²   Estimated body mass index is 41.95 kg/m² as calculated from the following:    Height as of this encounter: 172.7 cm (68\").    Weight as of this encounter: 125 kg (275 lb 14.4 oz).     BP Readings from Last 3 Encounters:   12/04/23 152/92   08/03/23 140/88   05/31/23 148/92               Physical Exam  Vitals reviewed.   Constitutional:       Appearance: Normal appearance.   HENT:      Head: Normocephalic and atraumatic.      Right Ear: External ear normal.      Left Ear: External ear normal.      Mouth/Throat:      Pharynx: No oropharyngeal exudate.   Eyes:      Conjunctiva/sclera: Conjunctivae normal.   Cardiovascular:      Rate and Rhythm: Normal rate and regular rhythm.      Heart sounds: No murmur heard.     No friction rub. No gallop.   Pulmonary:      Effort: Pulmonary effort is normal.      Breath sounds: Normal breath sounds. No wheezing or rhonchi.   Abdominal:      General: Bowel sounds are normal. There is no distension.      Palpations: Abdomen is soft.      Tenderness: There is no abdominal tenderness. There is no right CVA tenderness or left CVA tenderness.      Comments: External hemorrhoid noted.  Nontender to palpation.   Skin:     General: Skin is warm and dry.   Neurological:      Mental Status: He is alert and oriented to person, place, and time.      Cranial Nerves: No cranial nerve deficit.   Psychiatric:         Mood and Affect: Mood and affect normal.         Behavior: Behavior normal.         Thought Content: Thought content normal.         Judgment: Judgment normal.        Result Review :                 "   Assessment and Plan   Diagnoses and all orders for this visit:    1. Primary hypertension (Primary)    2. Other migraine without status migrainosus, not intractable    3. Anxiety    4. Plantar fasciitis, left    5. Plantar fasciitis, right    6. Right flank pain  -     Urinalysis With Culture If Indicated -    7. History of nephrolithiasis    8. External hemorrhoid  -     Ambulatory Referral to General Surgery    I discussed with patient getting a urinalysis completed.  Plan as documented above otherwise.  Referral has been made to general surgery.  We did discuss continuing current management for migraine headaches as well as anxiety.  Patient strongly encouraged to check his blood pressure at home and to bring a log on the next visit.  I plan to see him back in 3 months or sooner if needed.         Follow Up   Return in about 3 months (around 3/4/2024) for hypertension.  Patient was given instructions and counseling regarding his condition or for health maintenance advice. Please see specific information pulled into the AVS if appropriate.

## 2023-12-05 LAB — HOLD SPECIMEN: NORMAL

## 2023-12-22 ENCOUNTER — OFFICE VISIT (OUTPATIENT)
Dept: SURGERY | Facility: CLINIC | Age: 53
End: 2023-12-22
Payer: MEDICARE

## 2023-12-22 VITALS
BODY MASS INDEX: 40.92 KG/M2 | WEIGHT: 270 LBS | DIASTOLIC BLOOD PRESSURE: 109 MMHG | SYSTOLIC BLOOD PRESSURE: 165 MMHG | HEIGHT: 68 IN

## 2023-12-22 DIAGNOSIS — K64.4 EXTERNAL HEMORRHOID: Primary | ICD-10-CM

## 2023-12-22 PROCEDURE — 1160F RVW MEDS BY RX/DR IN RCRD: CPT | Performed by: SURGERY

## 2023-12-22 PROCEDURE — 99203 OFFICE O/P NEW LOW 30 MIN: CPT | Performed by: SURGERY

## 2023-12-22 PROCEDURE — 1159F MED LIST DOCD IN RCRD: CPT | Performed by: SURGERY

## 2023-12-22 RX ORDER — SODIUM CHLORIDE 0.9 % (FLUSH) 0.9 %
10 SYRINGE (ML) INJECTION EVERY 12 HOURS SCHEDULED
OUTPATIENT
Start: 2023-12-22

## 2023-12-22 RX ORDER — SODIUM CHLORIDE 9 MG/ML
40 INJECTION, SOLUTION INTRAVENOUS AS NEEDED
OUTPATIENT
Start: 2023-12-22

## 2023-12-22 RX ORDER — DESONIDE 0.5 MG/G
CREAM TOPICAL
COMMUNITY
Start: 2023-12-14

## 2023-12-22 RX ORDER — ONDANSETRON 2 MG/ML
4 INJECTION INTRAMUSCULAR; INTRAVENOUS EVERY 6 HOURS PRN
OUTPATIENT
Start: 2023-12-22

## 2023-12-22 RX ORDER — SODIUM CHLORIDE 0.9 % (FLUSH) 0.9 %
10 SYRINGE (ML) INJECTION AS NEEDED
OUTPATIENT
Start: 2023-12-22

## 2023-12-22 RX ORDER — SODIUM CHLORIDE, SODIUM LACTATE, POTASSIUM CHLORIDE, CALCIUM CHLORIDE 600; 310; 30; 20 MG/100ML; MG/100ML; MG/100ML; MG/100ML
70 INJECTION, SOLUTION INTRAVENOUS CONTINUOUS
OUTPATIENT
Start: 2023-12-22

## 2023-12-22 NOTE — PROGRESS NOTES
Inpatient History and Physical Surgical Orders    Preadmission Location:   Preadmission Time:  Facility:  Surgery Date:  Surgery Time:  Preadmission Test date:     Chief Complaint  Outpatient History and Physical / Surgical Orders    Primary Care Provider: David Markham DO    Referring Provider: David Markham DO    Subjective      Patient Name: Иван Kearney : 1970    HPI  The patient is a 53-year-old gentleman that presents with a sizable external hemorrhoid.  He has had that there for some time and it it frequently is uncomfortable when he wipes.    Past History:  Medical History: has a past medical history of Allergies, Anxiety, Arthritis, Broken bones, Deafness, Depression, Head injury (), Hemorrhoids, Kidney stones (), Migraine, Night sweats, Psychiatric illness (), PTSD (post-traumatic stress disorder), Sinus trouble, and Skin disease.   Surgical History: has a past surgical history that includes Back surgery; Brain surgery (); Colonoscopy (2019); Leg Surgery (Right, ); Spine surgery (); Ureteroscopy; and Wrist surgery (Right, ).   Family History: family history includes Breast cancer (age of onset: 27) in his mother.   Social History: reports that he quit smoking about 8 years ago. His smoking use included cigarettes. He has a 33.00 pack-year smoking history. His smokeless tobacco use includes snuff and chew. He reports current alcohol use. He reports that he does not use drugs.  Allergies: Patient has no known allergies.       Current Outpatient Medications:     brimonidine (ALPHAGAN) 0.15 % ophthalmic solution, , Disp: , Rfl:     busPIRone (BUSPAR) 10 MG tablet, Take 0.5 tablets by mouth 2 (Two) Times a Day., Disp: 90 tablet, Rfl: 1    desonide (DESOWEN) 0.05 % cream, , Disp: , Rfl:     Diclofenac Sodium (VOLTAREN) 1 % gel gel, Apply 4 g topically to the appropriate area as directed 4 (Four) Times a Day As Needed (neck pain)., Disp: 100 g, Rfl: 1     "Hydrocortisone, Perianal, (Anusol-HC) 2.5 % rectal cream, Insert  into the rectum 2 (Two) Times a Day., Disp: 28 g, Rfl: 1    metoprolol succinate XL (Toprol XL) 25 MG 24 hr tablet, Take 1 tablet by mouth Daily., Disp: 90 tablet, Rfl: 3    Refresh Tears 0.5 % solution, , Disp: , Rfl:     SUMAtriptan (IMITREX) 100 MG tablet, Take 1 tablet by mouth 1 (One) Time As Needed for Migraine., Disp: 30 tablet, Rfl: 3    tiZANidine (ZANAFLEX) 4 MG tablet, Take 1 tablet by mouth Every 8 (Eight) Hours As Needed for Muscle Spasms., Disp: 90 tablet, Rfl: 1    vitamin D (ERGOCALCIFEROL) 1.25 MG (29203 UT) capsule capsule, Take 1 capsule by mouth Every 7 (Seven) Days., Disp: 13 capsule, Rfl: 3       Objective   Vital Signs:   BP (!) 165/109   Ht 172.7 cm (68\")   Wt 122 kg (270 lb)   BMI 41.05 kg/m²       Physical Exam  Vitals and nursing note reviewed.   Constitutional:       Appearance: Normal appearance. The patient is well-developed.   Cardiovascular:      Rate and Rhythm: Normal rate and regular rhythm.   Pulmonary:      Effort: Pulmonary effort is normal.      Breath sounds: Normal air entry.   Abdominal:      General: Bowel sounds are normal.      Palpations: Abdomen is soft.      Skin:     General: Skin is warm and dry.   Neurological:      Mental Status: The patient is alert and oriented to person, place, and time.      Motor: Motor function is intact.   Psychiatric:         Mood and Affect: Mood normal.   Rectal: He has a large external hemorrhoid    Result Review :               Assessment and Plan   Diagnoses and all orders for this visit:    1. External hemorrhoid (Primary)  -     Case Request; Standing  -     Follow Anesthesia Guidelines / Protocol; Standing  -     Verify NPO Status; Standing  -     Obtain Informed Consent; Standing  -     Verify / Perform Chlorhexidine Skin Prep; Standing  -     Verify / Perform Chlorhexidine Skin Prep if Indicated (If Not Already Completed); Standing  -     Insert Peripheral IV; " Standing  -     Saline Lock & Maintain IV Access; Standing  -     sodium chloride 0.9 % flush 10 mL  -     sodium chloride 0.9 % flush 10 mL  -     sodium chloride 0.9 % infusion 40 mL  -     lactated ringers infusion  -     ondansetron (ZOFRAN) injection 4 mg  -     ceFAZolin (ANCEF) 3,000 mg in sodium chloride 0.9 % 100 mL IVPB  -     Case Request    We will schedule him for an excision of this external hemorrhoid.  I described that procedure to him as well as the risk and benefits and he is agreeable to proceeding.    I  Kirill Quinn MD  12/22/2023

## 2024-03-04 ENCOUNTER — OFFICE VISIT (OUTPATIENT)
Dept: FAMILY MEDICINE CLINIC | Facility: CLINIC | Age: 54
End: 2024-03-04
Payer: MEDICARE

## 2024-03-04 VITALS
WEIGHT: 281.5 LBS | BODY MASS INDEX: 42.66 KG/M2 | OXYGEN SATURATION: 95 % | SYSTOLIC BLOOD PRESSURE: 130 MMHG | DIASTOLIC BLOOD PRESSURE: 70 MMHG | HEART RATE: 84 BPM | HEIGHT: 68 IN | TEMPERATURE: 97.9 F

## 2024-03-04 DIAGNOSIS — F41.9 ANXIETY: ICD-10-CM

## 2024-03-04 DIAGNOSIS — Z87.891 FORMER SMOKER: ICD-10-CM

## 2024-03-04 DIAGNOSIS — M72.2 PLANTAR FASCIITIS, RIGHT: ICD-10-CM

## 2024-03-04 DIAGNOSIS — R53.83 OTHER FATIGUE: ICD-10-CM

## 2024-03-04 DIAGNOSIS — R73.03 PREDIABETES: ICD-10-CM

## 2024-03-04 DIAGNOSIS — E78.00 PURE HYPERCHOLESTEROLEMIA: ICD-10-CM

## 2024-03-04 DIAGNOSIS — Z12.5 SCREENING FOR PROSTATE CANCER: ICD-10-CM

## 2024-03-04 DIAGNOSIS — M72.2 PLANTAR FASCIITIS, LEFT: ICD-10-CM

## 2024-03-04 DIAGNOSIS — G43.809 OTHER MIGRAINE WITHOUT STATUS MIGRAINOSUS, NOT INTRACTABLE: ICD-10-CM

## 2024-03-04 DIAGNOSIS — K64.4 EXTERNAL HEMORRHOID: ICD-10-CM

## 2024-03-04 DIAGNOSIS — I10 PRIMARY HYPERTENSION: Primary | ICD-10-CM

## 2024-03-04 DIAGNOSIS — E55.9 VITAMIN D DEFICIENCY: ICD-10-CM

## 2024-03-04 PROCEDURE — 99214 OFFICE O/P EST MOD 30 MIN: CPT | Performed by: FAMILY MEDICINE

## 2024-03-04 RX ORDER — CARBOXYMETHYLCELLULOSE SODIUM 5 MG/ML
1 SOLUTION/ DROPS OPHTHALMIC 2 TIMES DAILY PRN
Qty: 90 ML | Refills: 3 | Status: SHIPPED | OUTPATIENT
Start: 2024-03-04

## 2024-03-04 NOTE — PROGRESS NOTES
Chief Complaint  Hypertension  Migraines    Subjective          Иван Kearney presents to [facility]  History of Present Illness  Patient presents today to follow-up for hypertension.  His blood pressure has been running better when he checks it.  He is currently taking metoprolol 25 mg daily.  We did discuss caffeine consumption as this can contribute to elevated blood pressure readings.  He is taking metoprolol as well for migraine headaches.  His migraines for the most part have been adequately controlled.  He is not interested in making any changes at this time.  He is due for low-dose chest CT given his age as well as prior smoking history.  He quit smoking 11 years ago and smoked 1 pack/day for 20 years.  He does continue to have issues with plantar fasciitis and is requesting a podiatry referral.  He is not interested in injections.  He will have hemorrhoid surgery coming up on 4/3/2024.  He will be due for labs again when he returns for follow-up.    Current Outpatient Medications   Medication Instructions    brimonidine (ALPHAGAN) 0.15 % ophthalmic solution No dose, route, or frequency recorded.    busPIRone (BUSPAR) 5 mg, Oral, 2 Times Daily    desonide (DESOWEN) 0.05 % cream     Diclofenac Sodium (VOLTAREN) 4 g, Topical, 4 Times Daily PRN    Hydrocortisone, Perianal, (Anusol-HC) 2.5 % rectal cream Rectal, 2 Times Daily    metoprolol succinate XL (TOPROL XL) 25 mg, Oral, Daily    Refresh Tears 0.5 % solution 1 drop, Both Eyes, 2 Times Daily PRN    SUMAtriptan (IMITREX) 100 mg, Oral, Once As Needed    tiZANidine (ZANAFLEX) 4 mg, Oral, Every 8 Hours PRN    vitamin D (ERGOCALCIFEROL) 50,000 Units, Oral, Every 7 Days       The following portions of the patient's history were reviewed and updated as appropriate: allergies, current medications, past family history, past medical history, past social history, past surgical history, and problem list.    Objective   Vital  "Signs:   /70 (BP Location: Left arm, Patient Position: Sitting)   Pulse 84   Temp 97.9 °F (36.6 °C) (Oral)   Ht 172.7 cm (68\")   Wt 128 kg (281 lb 8 oz)   SpO2 95%   BMI 42.80 kg/m²     BP Readings from Last 3 Encounters:   03/04/24 130/70   12/22/23 (!) 165/109   12/04/23 152/92     Wt Readings from Last 3 Encounters:   03/04/24 128 kg (281 lb 8 oz)   12/22/23 122 kg (270 lb)   12/04/23 125 kg (275 lb 14.4 oz)           Physical Exam  Vitals reviewed.   Constitutional:       Appearance: Normal appearance.   HENT:      Head: Normocephalic and atraumatic.      Right Ear: External ear normal.      Left Ear: External ear normal.   Eyes:      Conjunctiva/sclera: Conjunctivae normal.   Cardiovascular:      Rate and Rhythm: Normal rate and regular rhythm.      Heart sounds: No murmur heard.     No friction rub. No gallop.   Pulmonary:      Effort: Pulmonary effort is normal.      Breath sounds: Normal breath sounds. No wheezing or rhonchi.   Abdominal:      General: Bowel sounds are normal. There is no distension.      Palpations: Abdomen is soft.      Tenderness: There is no abdominal tenderness.   Skin:     General: Skin is warm and dry.   Neurological:      Mental Status: He is alert and oriented to person, place, and time.      Cranial Nerves: No cranial nerve deficit.   Psychiatric:         Mood and Affect: Mood and affect normal.         Behavior: Behavior normal.         Thought Content: Thought content normal.         Judgment: Judgment normal.          [unfilled]    Result Review :   The following data was reviewed by: David Markham DO on 03/04/2024:  Common labs          5/16/2023    08:46   Common Labs   Glucose 87    BUN 12    Creatinine 1.24    Sodium 142    Potassium 4.1    Chloride 105    Calcium 9.5    Albumin 4.4    Total Bilirubin 0.5    Alkaline Phosphatase 69    AST (SGOT) 29    ALT (SGPT) 28    WBC 6.12    Hemoglobin 15.6    Hematocrit 44.9    Platelets 299    Total Cholesterol 219  "   Triglycerides 57    HDL Cholesterol 61    LDL Cholesterol  148    Hemoglobin A1C 5.60    PSA 1.090             Lab Results   Component Value Date    COVID19 Detected (C) 12/21/2021    BILIRUBINUR Negative 12/04/2023       Procedures        Assessment and Plan    Diagnoses and all orders for this visit:    1. Primary hypertension (Primary)  -     CBC & Differential; Future  -     Comprehensive Metabolic Panel; Future    2. External hemorrhoid    3. Other migraine without status migrainosus, not intractable    4. Anxiety    5. Vitamin D deficiency  -     Vitamin D,25-Hydroxy; Future    6. Former smoker  -      CT Chest Low Dose Cancer Screening WO; Future    7. Plantar fasciitis, left  -     Ambulatory Referral to Podiatry    8. Plantar fasciitis, right  -     Ambulatory Referral to Podiatry    9. Prediabetes  -     Hemoglobin A1c; Future    10. Pure hypercholesterolemia  -     Lipid Panel; Future    11. Screening for prostate cancer  -     PSA Screen; Future    12. Other fatigue  -     TSH+Free T4; Future    Other orders  -     Refresh Tears 0.5 % solution; Administer 1 drop to both eyes 2 (Two) Times a Day As Needed for Dry Eyes.  Dispense: 90 mL; Refill: 3    Plan as documented above.  I discussed with patient seeing him back in 3 months for follow-up.  We discussed continuing current management for migraine headaches as well as hypertension.  Low-dose chest CT has been ordered.  Podiatry referral has been made.      Medications Discontinued During This Encounter   Medication Reason    Refresh Tears 0.5 % solution Reorder          Follow Up   Return in about 3 months (around 6/4/2024) for Hypertension.  Patient was given instructions and counseling regarding his condition or for health maintenance advice. Please see specific information pulled into the AVS if appropriate.       David Markham DO  03/04/24  11:07 EST

## 2024-03-28 ENCOUNTER — OFFICE VISIT (OUTPATIENT)
Dept: PODIATRY | Facility: CLINIC | Age: 54
End: 2024-03-28
Payer: MEDICARE

## 2024-03-28 VITALS
DIASTOLIC BLOOD PRESSURE: 107 MMHG | WEIGHT: 283 LBS | TEMPERATURE: 97.7 F | HEIGHT: 68 IN | SYSTOLIC BLOOD PRESSURE: 157 MMHG | BODY MASS INDEX: 42.89 KG/M2 | HEART RATE: 77 BPM | OXYGEN SATURATION: 93 %

## 2024-03-28 DIAGNOSIS — M79.671 FOOT PAIN, BILATERAL: ICD-10-CM

## 2024-03-28 DIAGNOSIS — M79.672 FOOT PAIN, BILATERAL: ICD-10-CM

## 2024-03-28 DIAGNOSIS — M72.2 PLANTAR FASCIITIS: Primary | ICD-10-CM

## 2024-03-28 RX ORDER — METHYLPREDNISOLONE 4 MG/1
TABLET ORAL
Qty: 21 TABLET | Refills: 0 | Status: SHIPPED | OUTPATIENT
Start: 2024-03-28 | End: 2024-03-28

## 2024-03-28 RX ORDER — DICLOFENAC SODIUM 75 MG/1
75 TABLET, DELAYED RELEASE ORAL 2 TIMES DAILY
Qty: 60 TABLET | Refills: 1 | Status: SHIPPED | OUTPATIENT
Start: 2024-03-28 | End: 2024-04-27

## 2024-03-28 NOTE — PROGRESS NOTES
[facility] - PODIATRY    Today's Date: 03/28/24    Patient Name: Иван Kearney  MRN: [id number]  CSN: [id number]  PCP: David Markham DO  Referring Provider: David Markham DO    SUBJECTIVE     Chief Complaint   Patient presents with    Left Foot - Establish Care, Pain     Heel pain x 1.5 years    Right Foot - Establish Care, Pain     Heel pain x 1.5 years      HPI: Ивна Kearney, a 54 y.o.male, comes to clinic.    New, Established, New Problem:  new    Location: Bilateral heels    Duration:  1.5 years    Onset:  gradual    Nature:  achy, sharp, shooting    Aggravating factors:  Patient describes morning bilateral heel pain as stabbing, burning, or aching. This pain usually subsides throughout the day, however it returns after periods of rest and sitting, when standing back up on their feet, and again the next morning.      Previous Treatment:  none    Patient denies any fevers, chills, nausea, vomiting, shortness of breath, nor any other constitutional signs nor symptoms.    No other pedal complaints at this time.    Past Medical History:   Diagnosis Date    Allergies     Anxiety     Arthritis     Broken bones     Deafness     Depression     Foot pain, bilateral     Head injury 2007    Heel pain, bilateral     Hemorrhoids     Kidney stones 2013    Migraine     Night sweats     Psychiatric illness 2015    PTSD (post-traumatic stress disorder)     Sinus trouble     Skin disease      Past Surgical History:   Procedure Laterality Date    BACK SURGERY      BRAIN SURGERY  2015    SKULL FX    COLONOSCOPY  05/20/2019    [address] [place] [address] IN 2019-REPEAT IN 5 YEARS    LEG SURGERY Right 1999    BROKEN    SPINE SURGERY  2015    FUSED    URETEROSCOPY      STONE REMOVAL    WRIST SURGERY Right 2009    BROKEN     Family History   Problem Relation Age of Onset    Breast cancer Mother 27     Social History     Socioeconomic History    Marital status:    Tobacco Use    Smoking status:  Former     Current packs/day: 0.00     Average packs/day: 1 pack/day for 33.0 years (33.0 ttl pk-yrs)     Types: Cigarettes     Start date:      Quit date:      Years since quittin.2    Smokeless tobacco: Current     Types: Snuff, Chew    Tobacco comments:     QUIT SMOKING 2 YEARS AGO; SMOKELESS FOR 20 YEARS   Vaping Use    Vaping status: Never Used   Substance and Sexual Activity    Alcohol use: Yes     Comment: 2 DRINKS PER WEEK    Drug use: Never     No Known Allergies  Current Outpatient Medications   Medication Sig Dispense Refill    brimonidine (ALPHAGAN) 0.15 % ophthalmic solution       busPIRone (BUSPAR) 10 MG tablet Take 0.5 tablets by mouth 2 (Two) Times a Day. 90 tablet 1    desonide (DESOWEN) 0.05 % cream       Diclofenac Sodium (VOLTAREN) 1 % gel gel Apply 4 g topically to the appropriate area as directed 4 (Four) Times a Day As Needed (neck pain). 100 g 1    Hydrocortisone, Perianal, (Anusol-HC) 2.5 % rectal cream Insert  into the rectum 2 (Two) Times a Day. 28 g 1    metoprolol succinate XL (Toprol XL) 25 MG 24 hr tablet Take 1 tablet by mouth Daily. 90 tablet 3    Refresh Tears 0.5 % solution Administer 1 drop to both eyes 2 (Two) Times a Day As Needed for Dry Eyes. 90 mL 3    SUMAtriptan (IMITREX) 100 MG tablet Take 1 tablet by mouth 1 (One) Time As Needed for Migraine. 30 tablet 3    tiZANidine (ZANAFLEX) 4 MG tablet Take 1 tablet by mouth Every 8 (Eight) Hours As Needed for Muscle Spasms. 90 tablet 1    vitamin D (ERGOCALCIFEROL) 1.25 MG (17590 UT) capsule capsule Take 1 capsule by mouth Every 7 (Seven) Days. 13 capsule 3    diclofenac (VOLTAREN) 75 MG EC tablet Take 1 tablet by mouth 2 (Two) Times a Day for 30 days. 60 tablet 1    methylPREDNISolone (MEDROL) 4 MG dose pack Take as directed 21 tablet 0     No current facility-administered medications for this visit.     Review of Systems   Constitutional: Negative.    Musculoskeletal:         Bilateral heel pain   All other systems  reviewed and are negative.      OBJECTIVE     Vitals:    03/28/24 0955   BP: (!) 157/107   Pulse:    Temp:    SpO2:        PHYSICAL EXAM     Foot/Ankle Exam    GENERAL  Appearance:  appears stated age and obese  Orientation:  AAOx3  Affect:  appropriate  Gait:  unimpaired  Assistance:  independent  Right shoe gear: casual shoe  Left shoe gear: casual shoe    VASCULAR     Right Foot Vascularity   Dorsalis pedis:  2+  Posterior tibial:  2+  Skin temperature:  warm  Edema grading:  None  CFT:  < 3 seconds  Pedal hair growth:  Present  Varicosities:  mild varicosities     Left Foot Vascularity   Dorsalis pedis:  2+  Posterior tibial:  2+  Skin temperature:  warm  Edema grading:  None  CFT:  < 3 seconds  Pedal hair growth:  Present  Varicosities:  mild varicosities     NEUROLOGIC     Right Foot Neurologic   Normal sensation    Light touch sensation: normal  Vibratory sensation: normal  Hot/Cold sensation: normal  Protective Sensation using [place] Monofilament:   Sites intact: 10  Sites tested: 10     Left Foot Neurologic   Normal sensation    Light touch sensation: normal  Vibratory sensation: normal  Hot/Cold sensation:  normal  Protective Sensation using [place] Monofilament:   Sites intact: 10  Sites tested: 10    MUSCULOSKELETAL     Right Foot Musculoskeletal   Ecchymosis:  none  Tenderness:  plantar fascia tenderness    Arch:  Pes planus     Left Foot Musculoskeletal   Ecchymosis:  none  Tenderness:  plantar fascia tenderness  Arch:  Pes planus  Hallux valgus: Yes      MUSCLE STRENGTH     Right Foot Muscle Strength   Foot dorsiflexion:  4  Foot plantar flexion:  4  Foot inversion:  4  Foot eversion:  4     Left Foot Muscle Strength   Foot dorsiflexion:  4  Foot plantar flexion:  4  Foot inversion:  4  Foot eversion:  4    RANGE OF MOTION     Right Foot Range of Motion   Foot and ankle ROM within normal limits       Left Foot Range of Motion   Foot and ankle ROM within normal limits       DERMATOLOGIC      Right Foot Dermatologic   Skin  Right foot skin is intact.   Nails comment:  Toenails 1, 2, 3, 4, and 5     Left Foot Dermatologic   Skin  Left foot skin is intact.   Nails comment:  Toenails 1, 2, 3, 4, and 5      RADIOLOGY:    XR Foot 3+ View Right    Result Date: 3/28/2024  Narrative: IN-OFFICE IMAGING:  Standing, weightbearing, 3 view, AP, MO, Lateral, Right foot Indication:  Foot Pain Findings: Anterior cyma line position seen.  Inferior calcaneal enthesopathy.  Degenerative changes seen in first metatarsal phalangeal joint.  No periosteal reactions nor osteolytic changes seen.  No occult fractures seen. Comparison: No comparison views available.     XR Foot 3+ View Left    Result Date: 3/28/2024  Narrative: IN-OFFICE IMAGING:  Standing, weightbearing, 3 view, AP, MO, Lateral, Left foot Indication:  Foot Pain Findings: Anterior cyma line position seen.  Inferior calcaneal enthesopathy.  Bunion with degenerative changes seen in first metatarsal phalangeal joint.  No periosteal reactions nor osteolytic changes seen.  No occult fractures seen. Comparison: No comparison views available.      ASSESSMENT/PLAN     Diagnoses and all orders for this visit:    1. Plantar fasciitis (Primary)  -     Ambulatory Referral For Orthotics  -     diclofenac (VOLTAREN) 75 MG EC tablet; Take 1 tablet by mouth 2 (Two) Times a Day for 30 days.  Dispense: 60 tablet; Refill: 1  -     methylPREDNISolone (MEDROL) 4 MG dose pack; Take as directed  Dispense: 21 tablet; Refill: 0    2. Foot pain, bilateral      Comprehensive lower extremity examination and evaluation was performed.    Discussed findings and treatment plan including risks, benefits, and treatment options with patient in detail. Patient agreed with treatment plan.    Patient tolerated the procedure well with no immediate complications.    Treatment Options discussed:  - no treatment at all  - change in shoegear  - change in activities  - RICE therapy  -  arch support  - NSAIDs  - PO steroids  - injectable steroids    Rice Therapy: It is important to treat any injury as soon as possible to help control swelling and increase recovery time. The recognized regimen for immediate treatment of sport injuries includes rest, ice (cold application), compression, and elevation (RICE). Remove the injured athlete from play, apply ice to the affected area, wrap or compress the injured area with an elastic bandage when appropriate, and elevate the injured area above heart level to reduce swelling.  The patient is to not use ice for longer than 20 minutes at a time, with at least 20 minutes of no ice usage between applications.  The patient states understanding and agreement with this plan.    Patient instructed use OTC analgesics with dosing per package insert as needed.  Patient states understanding and agreement with this plan.    An After Visit Summary was printed and given to the patient at discharge, including (if requested) any available informative/educational handouts regarding diagnosis, treatment, or medications. All questions were answered to patient/family satisfaction. Should symptoms fail to improve or worsen they agree to call or return to clinic or to go to the Emergency Department. Discussed the importance of following up with any needed screening tests/labs/specialist appointments and any requested follow-up recommended by me today. Importance of maintaining follow-up discussed and patient accepts that missed appointments can delay diagnosis and potentially lead to worsening of conditions.    Return in about 3 weeks (around 4/18/2024), or if symptoms worsen or fail to improve, for Rx f/u., or sooner if acute issues arise.    This document has been electronically signed by Jereym Lorenzo DPM on March 28, 2024 10:27 EDT

## 2024-03-28 NOTE — LETTER
March 28, 2024       No Recipients    Patient: Иван Kearney   YOB: 1970   Date of Visit: 3/28/2024       Dear David Markham, DO:    Thank you for referring Иван Kearney to me for evaluation. Below are the relevant portions of my assessment and plan of care.    Encounter Diagnosis and Orders:  Diagnoses and all orders for this visit:    1. Plantar fasciitis (Primary)  -     Ambulatory Referral For Orthotics  -     diclofenac (VOLTAREN) 75 MG EC tablet; Take 1 tablet by mouth 2 (Two) Times a Day for 30 days.  Dispense: 60 tablet; Refill: 1  -     methylPREDNISolone (MEDROL) 4 MG dose pack; Take as directed  Dispense: 21 tablet; Refill: 0    2. Foot pain, bilateral        If you have questions, please do not hesitate to call me. I look forward to following Иван along with you.         Sincerely,        Jeremy Lorenzo DPM        CC:   No Recipients

## 2024-03-28 NOTE — PRE-PROCEDURE INSTRUCTIONS
PATIENT INSTRUCTED TO BE:    - NOTHING TO EAT AFTER MIDNIGHT OR CHEW, EXCEPT CAN HAVE CLEAR LIQUIDS 2 HOURS PRIOR TO SURGERY ARRIVAL TIME     - TO HOLD ALL VITAMINS, SUPPLEMENTS, NSAIDS FOR ONE WEEK PRIOR TO THEIR SURGICAL PROCEDURE    - DO NOT TAKE ___---------------- 7 DAYS PRIOR TO PROCEDURE PER ANESTHESIA RECOMMENDATIONS/INSTRUCTIONS     - INSTRUCTED PT TO USE SURGICAL SOAP 1 TIME THE NIGHT PRIOR TO SURGERY OR THE AM OF SURGERY.   USE SOAP FROM NECK TO TOES AVOID THEIR FACE, HAIR, AND PRIVATE PARTS. INSTRUCTED NO LOTIONS, JEWELRY, PIERCINGS, OR DEODORANT DAY OF SURGERY    - IF DIABETIC, CHECK BLOOD GLUCOSE IF LESS THAN 70 OR HAVING SYMPTOMS CALL THE PREOP AREA FOR INSTRUCTIONS ON AM OF SURGERY ( [phone])    -INSTRUCTED TO TAKE THE FOLLOWING MEDICATIONS THE DAY OF SURGERY:             ALPHAGAN PRN, BUSPAR, METOPROLOL, EYE DROPS, IMITREX PRN, ZANAFLEX PRN       - DO NOT BRING ANY MEDICATIONS WITH YOU TO THE HOSPITAL THE DAY OF SURGERY, EXCEPT IF USE INHALERS. BRING INHALERS DAY OF SURGERY       - BRING CPAP OR BIPAP TO THE HOSPITAL ONLY IF ARE SPENDING THE NIGHT    - DO NOT SMOKE OR VAPE 24 HOURS PRIOR TO PROCEDURE PER ANESTHESIA REQUEST     -MAKE SURE YOU HAVE A RIDE HOME OR SOMEONE TO STAY WITH YOU THE DAY OF THE PROCEDURE AFTER YOU GO HOME    - FOLLOW ANY OTHER INSTRUCTIONS GIVEN TO YOU BY YOUR SURGEON'S OFFICE.     - PREADMISSION TESTING NURSE YOEL BARNARD RN AT  [phone] IF HAVE ANY QUESTIONS     PATIENT PROVIDED THE NUMBER FOR PREOP SURGICAL DEPT IF HAD QUESTIONS AFTER HOURS PRIOR TO SURGERY ([phone])  INFORMED PT IF NO ANSWER, LEAVE A MESSAGE AND SOMEONE WILL RETURN THEIR CALL       PATIENT VERBALIZED UNDERSTANDING

## 2024-04-03 ENCOUNTER — ANESTHESIA (OUTPATIENT)
Dept: PERIOP | Facility: HOSPITAL | Age: 54
End: 2024-04-03
Payer: MEDICARE

## 2024-04-03 ENCOUNTER — HOSPITAL ENCOUNTER (OUTPATIENT)
Facility: HOSPITAL | Age: 54
Setting detail: HOSPITAL OUTPATIENT SURGERY
Discharge: HOME | End: 2024-04-03
Attending: SURGERY | Admitting: SURGERY
Payer: MEDICARE

## 2024-04-03 ENCOUNTER — ANESTHESIA EVENT (OUTPATIENT)
Dept: PERIOP | Facility: HOSPITAL | Age: 54
End: 2024-04-03
Payer: MEDICARE

## 2024-04-03 VITALS
DIASTOLIC BLOOD PRESSURE: 101 MMHG | HEART RATE: 69 BPM | HEIGHT: 68 IN | SYSTOLIC BLOOD PRESSURE: 151 MMHG | OXYGEN SATURATION: 94 % | BODY MASS INDEX: 41.3 KG/M2 | WEIGHT: 272.49 LBS | TEMPERATURE: 98.2 F | RESPIRATION RATE: 16 BRPM

## 2024-04-03 DIAGNOSIS — K64.4 EXTERNAL HEMORRHOID: ICD-10-CM

## 2024-04-03 PROCEDURE — 25010000002 SUGAMMADEX 200 MG/2ML SOLUTION: Performed by: NURSE ANESTHETIST, CERTIFIED REGISTERED

## 2024-04-03 PROCEDURE — 25010000002 ONDANSETRON PER 1 MG: Performed by: NURSE ANESTHETIST, CERTIFIED REGISTERED

## 2024-04-03 PROCEDURE — 88304 TISSUE EXAM BY PATHOLOGIST: CPT | Performed by: SURGERY

## 2024-04-03 PROCEDURE — 25010000002 CEFAZOLIN 3 G RECONSTITUTED SOLUTION: Performed by: SURGERY

## 2024-04-03 PROCEDURE — 25010000002 FENTANYL CITRATE (PF) 50 MCG/ML SOLUTION: Performed by: NURSE ANESTHETIST, CERTIFIED REGISTERED

## 2024-04-03 PROCEDURE — 25010000002 BUPIVACAINE (PF) 0.5 % SOLUTION: Performed by: SURGERY

## 2024-04-03 PROCEDURE — 46255 REMOVE INT/EXT HEM 1 GROUP: CPT | Performed by: SURGERY

## 2024-04-03 PROCEDURE — 25010000002 PROPOFOL 200 MG/20ML EMULSION: Performed by: NURSE ANESTHETIST, CERTIFIED REGISTERED

## 2024-04-03 PROCEDURE — 25810000003 LACTATED RINGERS PER 1000 ML: Performed by: ANESTHESIOLOGY

## 2024-04-03 PROCEDURE — 25010000002 MIDAZOLAM PER 1MG: Performed by: ANESTHESIOLOGY

## 2024-04-03 PROCEDURE — 25010000002 DEXAMETHASONE PER 1 MG: Performed by: NURSE ANESTHETIST, CERTIFIED REGISTERED

## 2024-04-03 RX ORDER — SODIUM CHLORIDE, SODIUM LACTATE, POTASSIUM CHLORIDE, CALCIUM CHLORIDE 600; 310; 30; 20 MG/100ML; MG/100ML; MG/100ML; MG/100ML
70 INJECTION, SOLUTION INTRAVENOUS CONTINUOUS
Status: DISCONTINUED | OUTPATIENT
Start: 2024-04-03 | End: 2024-04-03 | Stop reason: HOSPADM

## 2024-04-03 RX ORDER — IBUPROFEN 600 MG/1
600 TABLET ORAL EVERY 6 HOURS PRN
Status: DISCONTINUED | OUTPATIENT
Start: 2024-04-03 | End: 2024-04-03 | Stop reason: HOSPADM

## 2024-04-03 RX ORDER — FENTANYL CITRATE 50 UG/ML
INJECTION, SOLUTION INTRAMUSCULAR; INTRAVENOUS AS NEEDED
Status: DISCONTINUED | OUTPATIENT
Start: 2024-04-03 | End: 2024-04-03 | Stop reason: SURG

## 2024-04-03 RX ORDER — PROMETHAZINE HYDROCHLORIDE 12.5 MG/1
25 TABLET ORAL ONCE AS NEEDED
Status: DISCONTINUED | OUTPATIENT
Start: 2024-04-03 | End: 2024-04-03 | Stop reason: HOSPADM

## 2024-04-03 RX ORDER — ONDANSETRON 2 MG/ML
4 INJECTION INTRAMUSCULAR; INTRAVENOUS ONCE AS NEEDED
Status: DISCONTINUED | OUTPATIENT
Start: 2024-04-03 | End: 2024-04-03 | Stop reason: HOSPADM

## 2024-04-03 RX ORDER — HYDROCODONE BITARTRATE AND ACETAMINOPHEN 5; 325 MG/1; MG/1
1 TABLET ORAL EVERY 6 HOURS PRN
Qty: 10 TABLET | Refills: 0 | Status: SHIPPED | OUTPATIENT
Start: 2024-04-03

## 2024-04-03 RX ORDER — SODIUM CHLORIDE 0.9 % (FLUSH) 0.9 %
10 SYRINGE (ML) INJECTION AS NEEDED
Status: DISCONTINUED | OUTPATIENT
Start: 2024-04-03 | End: 2024-04-03 | Stop reason: HOSPADM

## 2024-04-03 RX ORDER — PROPOFOL 10 MG/ML
INJECTION, EMULSION INTRAVENOUS AS NEEDED
Status: DISCONTINUED | OUTPATIENT
Start: 2024-04-03 | End: 2024-04-03 | Stop reason: SURG

## 2024-04-03 RX ORDER — BUPIVACAINE HYDROCHLORIDE 5 MG/ML
INJECTION, SOLUTION EPIDURAL; INTRACAUDAL AS NEEDED
Status: DISCONTINUED | OUTPATIENT
Start: 2024-04-03 | End: 2024-04-03 | Stop reason: HOSPADM

## 2024-04-03 RX ORDER — OXYCODONE HYDROCHLORIDE 5 MG/1
5 TABLET ORAL
Status: DISCONTINUED | OUTPATIENT
Start: 2024-04-03 | End: 2024-04-03 | Stop reason: HOSPADM

## 2024-04-03 RX ORDER — ONDANSETRON 2 MG/ML
INJECTION INTRAMUSCULAR; INTRAVENOUS AS NEEDED
Status: DISCONTINUED | OUTPATIENT
Start: 2024-04-03 | End: 2024-04-03 | Stop reason: SURG

## 2024-04-03 RX ORDER — MEPERIDINE HYDROCHLORIDE 25 MG/ML
12.5 INJECTION INTRAMUSCULAR; INTRAVENOUS; SUBCUTANEOUS
Status: DISCONTINUED | OUTPATIENT
Start: 2024-04-03 | End: 2024-04-03 | Stop reason: HOSPADM

## 2024-04-03 RX ORDER — LIDOCAINE HYDROCHLORIDE 20 MG/ML
INJECTION, SOLUTION EPIDURAL; INFILTRATION; INTRACAUDAL; PERINEURAL AS NEEDED
Status: DISCONTINUED | OUTPATIENT
Start: 2024-04-03 | End: 2024-04-03 | Stop reason: SURG

## 2024-04-03 RX ORDER — MIDAZOLAM HYDROCHLORIDE 2 MG/2ML
2 INJECTION, SOLUTION INTRAMUSCULAR; INTRAVENOUS ONCE
Status: COMPLETED | OUTPATIENT
Start: 2024-04-03 | End: 2024-04-03

## 2024-04-03 RX ORDER — ACETAMINOPHEN 500 MG
1000 TABLET ORAL ONCE
Status: COMPLETED | OUTPATIENT
Start: 2024-04-03 | End: 2024-04-03

## 2024-04-03 RX ORDER — PROMETHAZINE HYDROCHLORIDE 25 MG/1
25 SUPPOSITORY RECTAL ONCE AS NEEDED
Status: DISCONTINUED | OUTPATIENT
Start: 2024-04-03 | End: 2024-04-03 | Stop reason: HOSPADM

## 2024-04-03 RX ORDER — ONDANSETRON 4 MG/1
4 TABLET, ORALLY DISINTEGRATING ORAL ONCE AS NEEDED
Status: DISCONTINUED | OUTPATIENT
Start: 2024-04-03 | End: 2024-04-03 | Stop reason: HOSPADM

## 2024-04-03 RX ORDER — ROCURONIUM BROMIDE 10 MG/ML
INJECTION, SOLUTION INTRAVENOUS AS NEEDED
Status: DISCONTINUED | OUTPATIENT
Start: 2024-04-03 | End: 2024-04-03 | Stop reason: SURG

## 2024-04-03 RX ORDER — HYDROCODONE BITARTRATE AND ACETAMINOPHEN 5; 325 MG/1; MG/1
1 TABLET ORAL ONCE AS NEEDED
Status: DISCONTINUED | OUTPATIENT
Start: 2024-04-03 | End: 2024-04-03 | Stop reason: HOSPADM

## 2024-04-03 RX ORDER — DEXAMETHASONE SODIUM PHOSPHATE 4 MG/ML
INJECTION, SOLUTION INTRA-ARTICULAR; INTRALESIONAL; INTRAMUSCULAR; INTRAVENOUS; SOFT TISSUE AS NEEDED
Status: DISCONTINUED | OUTPATIENT
Start: 2024-04-03 | End: 2024-04-03 | Stop reason: SURG

## 2024-04-03 RX ORDER — CEFAZOLIN SODIUM IN 0.9 % NACL 3 G/100 ML
3000 INTRAVENOUS SOLUTION, PIGGYBACK (ML) INTRAVENOUS ONCE
Status: COMPLETED | OUTPATIENT
Start: 2024-04-03 | End: 2024-04-03

## 2024-04-03 RX ORDER — SODIUM CHLORIDE 9 MG/ML
40 INJECTION, SOLUTION INTRAVENOUS AS NEEDED
Status: DISCONTINUED | OUTPATIENT
Start: 2024-04-03 | End: 2024-04-03 | Stop reason: HOSPADM

## 2024-04-03 RX ORDER — SODIUM CHLORIDE, SODIUM LACTATE, POTASSIUM CHLORIDE, CALCIUM CHLORIDE 600; 310; 30; 20 MG/100ML; MG/100ML; MG/100ML; MG/100ML
9 INJECTION, SOLUTION INTRAVENOUS CONTINUOUS PRN
Status: DISCONTINUED | OUTPATIENT
Start: 2024-04-03 | End: 2024-04-03 | Stop reason: HOSPADM

## 2024-04-03 RX ORDER — SODIUM CHLORIDE 0.9 % (FLUSH) 0.9 %
10 SYRINGE (ML) INJECTION EVERY 12 HOURS SCHEDULED
Status: DISCONTINUED | OUTPATIENT
Start: 2024-04-03 | End: 2024-04-03 | Stop reason: HOSPADM

## 2024-04-03 RX ORDER — DEXMEDETOMIDINE HYDROCHLORIDE 100 UG/ML
INJECTION, SOLUTION INTRAVENOUS AS NEEDED
Status: DISCONTINUED | OUTPATIENT
Start: 2024-04-03 | End: 2024-04-03 | Stop reason: SURG

## 2024-04-03 RX ADMIN — SUGAMMADEX 200 MG: 100 INJECTION, SOLUTION INTRAVENOUS at 11:55

## 2024-04-03 RX ADMIN — ONDANSETRON 4 MG: 2 INJECTION INTRAMUSCULAR; INTRAVENOUS at 11:53

## 2024-04-03 RX ADMIN — DEXMEDETOMIDINE 10 MCG: 100 INJECTION, SOLUTION INTRAVENOUS at 11:31

## 2024-04-03 RX ADMIN — Medication 2000 MG: at 11:31

## 2024-04-03 RX ADMIN — DEXAMETHASONE SODIUM PHOSPHATE 4 MG: 4 INJECTION, SOLUTION INTRAMUSCULAR; INTRAVENOUS at 11:53

## 2024-04-03 RX ADMIN — LIDOCAINE HYDROCHLORIDE 100 MG: 20 INJECTION, SOLUTION EPIDURAL; INFILTRATION; INTRACAUDAL; PERINEURAL at 11:36

## 2024-04-03 RX ADMIN — FENTANYL CITRATE 100 MCG: 50 INJECTION, SOLUTION INTRAMUSCULAR; INTRAVENOUS at 11:36

## 2024-04-03 RX ADMIN — MIDAZOLAM HYDROCHLORIDE 2 MG: 1 INJECTION, SOLUTION INTRAMUSCULAR; INTRAVENOUS at 11:25

## 2024-04-03 RX ADMIN — PROPOFOL 100 MG: 10 INJECTION, EMULSION INTRAVENOUS at 12:01

## 2024-04-03 RX ADMIN — SODIUM CHLORIDE, POTASSIUM CHLORIDE, SODIUM LACTATE AND CALCIUM CHLORIDE 9 ML/HR: 600; 310; 30; 20 INJECTION, SOLUTION INTRAVENOUS at 10:42

## 2024-04-03 RX ADMIN — ROCURONIUM BROMIDE 50 MG: 10 INJECTION, SOLUTION INTRAVENOUS at 11:36

## 2024-04-03 RX ADMIN — ACETAMINOPHEN 1000 MG: 500 TABLET ORAL at 10:42

## 2024-04-03 RX ADMIN — DEXMEDETOMIDINE 10 MCG: 100 INJECTION, SOLUTION INTRAVENOUS at 11:42

## 2024-04-03 RX ADMIN — DEXMEDETOMIDINE 10 MCG: 100 INJECTION, SOLUTION INTRAVENOUS at 11:54

## 2024-04-03 RX ADMIN — PROPOFOL 200 MG: 10 INJECTION, EMULSION INTRAVENOUS at 11:36

## 2024-04-03 NOTE — ANESTHESIA POSTPROCEDURE EVALUATION
Patient: Иван Kearney    Procedure Summary       Date: 04/03/24 Room / Location: [facility] OR [address] [place] [address] OR [facility]    Anesthesia Start: 1131 Anesthesia Stop: 1228    Procedure: HEMORRHOIDECTOMY (Anus) Diagnosis:       External hemorrhoid      (External hemorrhoid [K64.4])    Surgeons: Kirill Quinn MD Provider: Eleni Fontana MD    Anesthesia Type: general ASA Status: 3            Anesthesia Type: general    Vitals  Vitals Value Taken Time   /99 04/03/24 1241   Temp 36.4 °C (97.5 °F) 04/03/24 1221   Pulse 69 04/03/24 1253   Resp 16 04/03/24 1226   SpO2 92 % 04/03/24 1253   Vitals shown include unfiled device data.        Post Anesthesia Care and Evaluation    Patient location during evaluation: bedside  Patient participation: complete - patient participated  Level of consciousness: awake  Pain management: adequate    Airway patency: patent  PONV Status: none  Cardiovascular status: acceptable and stable  Respiratory status: acceptable  Hydration status: acceptable    Comments: An Anesthesiologist personally participated in the most demanding procedures (including induction and emergence if applicable) in the anesthesia plan, monitored the course of anesthesia administration at frequent intervals and remained physically present and available for immediate diagnosis and treatment of emergencies.

## 2024-04-03 NOTE — OP NOTE
HEMORRHOIDECTOMY  Procedure Report    Patient Name:  Иван Kearney  YOB: 1970    Date of Surgery:  4/3/2024     Indications: The patient is a 54-year-old gentleman who presented with a symptomatic external hemorrhoid.  The decision was made to proceed with a 1 quadrant hemorrhoidectomy.    Pre-op Diagnosis: Hemorrhoids    Post-Op Diagnosis: Same    Procedure/CPT® Codes:    1 quadrant surgical hemorrhoidectomy    Staff:  Surgeon(s):  Kirill Quinn MD    Assistant: Kelsey Amado CRNFA    Anesthesia: Monitored Anesthesia Care    Estimated Blood Loss: minimal    Implants:    Nothing was implanted during the procedure    Specimen:          Specimens       ID Source Type Tests Collected By Collected At Frozen?    A Hemorrhoid(s) Tissue TISSUE PATHOLOGY EXAM   Kirill Quinn MD 4/3/24 1155 No    Description: hemorrhoid                Findings: Large external hemorrhoid associated with the right posterior lateral hemorrhoid complex    Complications: None    Description of Procedure: The patient was taken to the operating room and placed on the table in supine position.  After ministration of MAC anesthesia, the patient was placed in lithotomy position and the perianal area was prepped and draped.  He had a very large skin tag associated with an external hemorrhoid in the right posterior lateral position.  I grasped this hemorrhoid with an Allis clamp and incised the anoderm there with a 15 blade scalpel.  I then extended the mucosal incisions around this hemorrhoid up into the anal canal using the LigaSure.  After dissecting the hemorrhoid up off the sphincter muscle I divided the base of the hemorrhoid with the LigaSure and removed the right posterior lateral hemorrhoid from the field.  The mucosal defect was closed with a running 2-0 Vicryl suture.  Sterile dressings were applied and he was taken the postanesthesia recovery room in stable condition.    Assistant: Kelsey Amado CRNFA  was  responsible for performing the following activities: Retraction and Placing Dressing and their skilled assistance was necessary for the success of this case.    Kirill Quinn MD     Date: 4/3/2024  Time: 12:00 EDT

## 2024-04-03 NOTE — ANESTHESIA PREPROCEDURE EVALUATION
Anesthesia Evaluation     Patient summary reviewed and Nursing notes reviewed   no history of anesthetic complications:   NPO Solid Status: > 8 hours  NPO Liquid Status: > 2 hours           Airway   Mallampati: II  TM distance: >3 FB  Neck ROM: full  Dental - normal exam     Pulmonary - normal exam   (+) a smoker Former,sleep apnea  Cardiovascular - negative cardio ROS and normal exam  Exercise tolerance: good (4-7 METS)        Neuro/Psych  (+) headaches, psychiatric history Anxiety, Depression and PTSD  GI/Hepatic/Renal/Endo    (+) morbid obesity, renal disease- stones    Musculoskeletal     Abdominal   (+) obese   Substance History   (+) alcohol use     OB/GYN negative ob/gyn ROS         Other   arthritis,     ROS/Med Hx Other: PAT Nursing Notes unavailable.               Anesthesia Plan    ASA 3     general     (Patient states they come out of anesthesia agitated and aggressive)  intravenous induction     Anesthetic plan, risks, benefits, and alternatives have been provided, discussed and informed consent has been obtained with: patient.    Plan discussed with CRNA.    CODE STATUS:

## 2024-04-03 NOTE — H&P
[facility]   HISTORY AND PHYSICAL    Patient Name: Иван Kearney  : 1970  MRN: [id number]  Primary Care Physician:  David Markham DO  Date of admission: 4/3/2024    Subjective   Subjective     Chief Complaint: External hemorrhoids    HPI:    Иван Kearney is a 54 y.o. male who presents with sizable external hemorrhoids.    Review of Systems   Respiratory:  Negative for shortness of breath.    Cardiovascular:  Negative for chest pain.       Personal History     Past Medical History:   Diagnosis Date    Allergies     Anxiety     Arthritis     Broken bones     Deafness     Depression     Foot pain, bilateral     Head injury     NO RESIDUAL    Heel pain, bilateral     Hemorrhoids     Kidney stones     Migraine     Night sweats     Psychiatric illness     PTSD (post-traumatic stress disorder)     Sinus trouble     Skin disease        Past Surgical History:   Procedure Laterality Date    BACK SURGERY      BRAIN SURGERY  2015    SKULL FX    COLONOSCOPY  2019    [address] [place] [address] IN 2019-REPEAT IN 5 YEARS    LEG SURGERY Right     BROKEN    SPINE SURGERY      FUSED    URETEROSCOPY      STONE REMOVAL    WRIST SURGERY Right     BROKEN       Family History: family history includes Breast cancer (age of onset: 27) in his mother. Otherwise pertinent FHx was reviewed and not pertinent to current issue.    Social History:  reports that he quit smoking about 9 years ago. His smoking use included cigarettes. He started smoking about 42 years ago. He has a 33 pack-year smoking history. His smokeless tobacco use includes snuff and chew. He reports current alcohol use. He reports that he does not use drugs.    Home Medications:  Diclofenac Sodium, SUMAtriptan, brimonidine, busPIRone, carboxymethylcellulose, diclofenac, metoprolol succinate XL, tiZANidine, and vitamin D    Allergies:  No Known Allergies    Objective    Objective     Vitals:   Temp:  [97 °F (36.1 °C)] 97 °F (36.1  °C)  Heart Rate:  [72] 72  Resp:  [18] 18  BP: (166-171)/(109-116) 166/116    Physical Exam  HENT:      Head: Normocephalic.   Cardiovascular:      Rate and Rhythm: Normal rate.   Pulmonary:      Effort: Pulmonary effort is normal.   Abdominal:      Palpations: Abdomen is soft.   Genitourinary:     Rectum: External hemorrhoid present.   Musculoskeletal:         General: Normal range of motion.      Cervical back: Normal range of motion.   Skin:     General: Skin is warm.   Neurological:      General: No focal deficit present.      Mental Status: He is alert.   Psychiatric:         Mood and Affect: Mood normal.         Result Review    Result Review:  I have personally reviewed the results from the time of this admission to 4/3/2024 11:23 EDT and agree with these findings:  []  Laboratory  []  Microbiology  []  Radiology  []  EKG/Telemetry   []  Cardiology/Vascular   []  Pathology  []  Old records  []  Other:  Most notable findings include:     Assessment & Plan   Assessment / Plan     Brief Patient Summary:  Иван Kearney is a 54 y.o. male who [place] with symptomatic sizable external hemorrhoids.    Active Hospital Problems:  Active Hospital Problems    Diagnosis     **External hemorrhoid        Plan:   We will proceed with a surgical hemorrhoidectomy.  I have described the procedure to him as well as the risk and benefits and he is agreeable to proceeding.    DVT prophylaxis:  No DVT prophylaxis order currently exists.        CODE STATUS:         Admission Status:  I believe this patient meets outpatient status.    Electronically signed by Kirill Quinn MD, 04/03/24, 11:23 AM EDT.

## 2024-04-04 ENCOUNTER — TELEPHONE (OUTPATIENT)
Dept: FAMILY MEDICINE CLINIC | Facility: CLINIC | Age: 54
End: 2024-04-04

## 2024-04-04 LAB
CYTO UR: NORMAL
LAB AP CASE REPORT: NORMAL
LAB AP CLINICAL INFORMATION: NORMAL
PATH REPORT.FINAL DX SPEC: NORMAL
PATH REPORT.GROSS SPEC: NORMAL

## 2024-04-04 NOTE — TELEPHONE ENCOUNTER
Caller: BRYCE REED    Relationship: Emergency Contact    Best call back number: [phone]    What form or medical record are you requesting: CURRENT MEDICATION AND TREATMENT PLAN    Who is requesting this form or medical record from you: [facility]    How would you like to receive the form or medical records (pick-up, mail, fax): FAX  If fax, what is the fax number: [phone] JANELLE CHRISTIAN   If mail, what is the address:   If pick-up, provide patient with address and location details    Timeframe paperwork needed: ASAP

## 2024-04-16 ENCOUNTER — OFFICE VISIT (OUTPATIENT)
Dept: SURGERY | Facility: CLINIC | Age: 54
End: 2024-04-16
Payer: MEDICARE

## 2024-04-16 VITALS — BODY MASS INDEX: 43.65 KG/M2 | HEIGHT: 68 IN | RESPIRATION RATE: 16 BRPM | WEIGHT: 288 LBS

## 2024-04-16 DIAGNOSIS — K64.4 EXTERNAL HEMORRHOID: Primary | ICD-10-CM

## 2024-04-16 PROCEDURE — 99024 POSTOP FOLLOW-UP VISIT: CPT | Performed by: SURGERY

## 2024-04-16 PROCEDURE — 1160F RVW MEDS BY RX/DR IN RCRD: CPT | Performed by: SURGERY

## 2024-04-16 PROCEDURE — 1159F MED LIST DOCD IN RCRD: CPT | Performed by: SURGERY

## 2024-04-16 NOTE — LETTER
April 16, 2024     David Markham DO  [address]  [address]  [place] [address]    Patient: Иван Kearney   YOB: 1970   Date of Visit: 4/16/2024     Dear David Markham DO:       Thank you for referring Иван Kearney to me for evaluation. Below are the relevant portions of my assessment and plan of care.    If you have questions, please do not hesitate to call me. I look forward to following Иван along with you.         Sincerely,        Kirill Quinn MD        CC: No Recipients    Kirill Quinn MD  04/16/24 1404  Sign when Signing Visit  Chief Complaint  Hemorrhoids and Post-op    Subjective         Иван Kearney presents to [facility] GENERAL SURGERY  History of Present Illness    Иван Kearney is a 54 y.o. male  who presents today for a postoperative visit.     Patient is here for a follow-up after a 1 quadrant hemorrhoidectomy for an external hemorrhoid with a large skin tag.  He is doing great and had no complaints today.    Past History:  Medical History: has a past medical history of Allergies, Anxiety, Arthritis, Broken bones, Deafness, Depression, Foot pain, bilateral, Head injury (2007), Heel pain, bilateral, Hemorrhoids, Kidney stones (2013), Migraine, Night sweats, Psychiatric illness (2015), PTSD (post-traumatic stress disorder), Sinus trouble, and Skin disease.   Surgical History: has a past surgical history that includes Back surgery; Brain surgery (2015); Colonoscopy (05/20/2019); Leg Surgery (Right, 1999); Spine surgery (2015); Ureteroscopy; Wrist surgery (Right, 2009); and Hemorrhoid surgery (N/A, 4/3/2024).   Family History: family history includes Breast cancer (age of onset: 27) in his mother.   Social History: reports that he quit smoking about 9 years ago. His smoking use included cigarettes. He started smoking about 42 years ago. He has a 33 pack-year smoking history. His smokeless tobacco use includes snuff and chew. He  "reports current alcohol use. He reports that he does not use drugs.  Allergies: Patient has no known allergies.       Current Outpatient Medications:   •  brimonidine (ALPHAGAN) 0.15 % ophthalmic solution, Administer 1 drop to both eyes Daily As Needed., Disp: , Rfl:   •  busPIRone (BUSPAR) 10 MG tablet, Take 0.5 tablets by mouth 2 (Two) Times a Day., Disp: 90 tablet, Rfl: 1  •  diclofenac (VOLTAREN) 75 MG EC tablet, Take 1 tablet by mouth 2 (Two) Times a Day for 30 days. (Patient taking differently: Take 1 tablet by mouth 2 (Two) Times a Day. PT STATED HE HASN'T STARTED TAKING YET), Disp: 60 tablet, Rfl: 1  •  Diclofenac Sodium (VOLTAREN) 1 % gel gel, Apply 4 g topically to the appropriate area as directed 4 (Four) Times a Day As Needed (neck pain)., Disp: 100 g, Rfl: 1  •  HYDROcodone-acetaminophen (NORCO) 5-325 MG per tablet, Take 1 tablet by mouth Every 6 (Six) Hours As Needed for Moderate Pain (Pain)., Disp: 10 tablet, Rfl: 0  •  metoprolol succinate XL (Toprol XL) 25 MG 24 hr tablet, Take 1 tablet by mouth Daily., Disp: 90 tablet, Rfl: 3  •  Refresh Tears 0.5 % solution, Administer 1 drop to both eyes 2 (Two) Times a Day As Needed for Dry Eyes., Disp: 90 mL, Rfl: 3  •  SUMAtriptan (IMITREX) 100 MG tablet, Take 1 tablet by mouth 1 (One) Time As Needed for Migraine., Disp: 30 tablet, Rfl: 3  •  tiZANidine (ZANAFLEX) 4 MG tablet, Take 1 tablet by mouth Every 8 (Eight) Hours As Needed for Muscle Spasms., Disp: 90 tablet, Rfl: 1  •  vitamin D (ERGOCALCIFEROL) 1.25 MG (37575 UT) capsule capsule, Take 1 capsule by mouth Every 7 (Seven) Days., Disp: 13 capsule, Rfl: 3       Physical Exam  Perianal area looks okay.  Objective    Vital Signs:   Resp 16   Ht 172.7 cm (67.99\")   Wt 131 kg (288 lb)   BMI 43.80 kg/m²              Assessment and Plan    Diagnoses and all orders for this visit:    1. External hemorrhoid (Primary)    I will see him back on an as-needed basis.  Have asked him to call me should he have any " further questions or concerns.

## 2024-04-16 NOTE — PROGRESS NOTES
Chief Complaint  Hemorrhoids and Post-op    Subjective          Иван Kearney presents to [facility] GENERAL SURGERY  History of Present Illness    Иван Kearney is a 54 y.o. male  who presents today for a postoperative visit.     Patient is here for a follow-up after a 1 quadrant hemorrhoidectomy for an external hemorrhoid with a large skin tag.  He is doing great and had no complaints today.    Past History:  Medical History: has a past medical history of Allergies, Anxiety, Arthritis, Broken bones, Deafness, Depression, Foot pain, bilateral, Head injury (2007), Heel pain, bilateral, Hemorrhoids, Kidney stones (2013), Migraine, Night sweats, Psychiatric illness (2015), PTSD (post-traumatic stress disorder), Sinus trouble, and Skin disease.   Surgical History: has a past surgical history that includes Back surgery; Brain surgery (2015); Colonoscopy (05/20/2019); Leg Surgery (Right, 1999); Spine surgery (2015); Ureteroscopy; Wrist surgery (Right, 2009); and Hemorrhoid surgery (N/A, 4/3/2024).   Family History: family history includes Breast cancer (age of onset: 27) in his mother.   Social History: reports that he quit smoking about 9 years ago. His smoking use included cigarettes. He started smoking about 42 years ago. He has a 33 pack-year smoking history. His smokeless tobacco use includes snuff and chew. He reports current alcohol use. He reports that he does not use drugs.  Allergies: Patient has no known allergies.       Current Outpatient Medications:     brimonidine (ALPHAGAN) 0.15 % ophthalmic solution, Administer 1 drop to both eyes Daily As Needed., Disp: , Rfl:     busPIRone (BUSPAR) 10 MG tablet, Take 0.5 tablets by mouth 2 (Two) Times a Day., Disp: 90 tablet, Rfl: 1    diclofenac (VOLTAREN) 75 MG EC tablet, Take 1 tablet by mouth 2 (Two) Times a Day for 30 days. (Patient taking differently: Take 1 tablet by mouth 2 (Two) Times a Day. PT STATED HE HASN'T STARTED TAKING YET),  "Disp: 60 tablet, Rfl: 1    Diclofenac Sodium (VOLTAREN) 1 % gel gel, Apply 4 g topically to the appropriate area as directed 4 (Four) Times a Day As Needed (neck pain)., Disp: 100 g, Rfl: 1    HYDROcodone-acetaminophen (NORCO) 5-325 MG per tablet, Take 1 tablet by mouth Every 6 (Six) Hours As Needed for Moderate Pain (Pain)., Disp: 10 tablet, Rfl: 0    metoprolol succinate XL (Toprol XL) 25 MG 24 hr tablet, Take 1 tablet by mouth Daily., Disp: 90 tablet, Rfl: 3    Refresh Tears 0.5 % solution, Administer 1 drop to both eyes 2 (Two) Times a Day As Needed for Dry Eyes., Disp: 90 mL, Rfl: 3    SUMAtriptan (IMITREX) 100 MG tablet, Take 1 tablet by mouth 1 (One) Time As Needed for Migraine., Disp: 30 tablet, Rfl: 3    tiZANidine (ZANAFLEX) 4 MG tablet, Take 1 tablet by mouth Every 8 (Eight) Hours As Needed for Muscle Spasms., Disp: 90 tablet, Rfl: 1    vitamin D (ERGOCALCIFEROL) 1.25 MG (55767 UT) capsule capsule, Take 1 capsule by mouth Every 7 (Seven) Days., Disp: 13 capsule, Rfl: 3       Physical Exam  Perianal area looks okay.  Objective     Vital Signs:   Resp 16   Ht 172.7 cm (67.99\")   Wt 131 kg (288 lb)   BMI 43.80 kg/m²              Assessment and Plan    Diagnoses and all orders for this visit:    1. External hemorrhoid (Primary)    I will see him back on an as-needed basis.  Have asked him to call me should he have any further questions or concerns.      "

## 2024-04-30 ENCOUNTER — OFFICE VISIT (OUTPATIENT)
Dept: PODIATRY | Facility: CLINIC | Age: 54
End: 2024-04-30
Payer: MEDICARE

## 2024-04-30 VITALS
DIASTOLIC BLOOD PRESSURE: 112 MMHG | TEMPERATURE: 98.6 F | BODY MASS INDEX: 42.58 KG/M2 | OXYGEN SATURATION: 97 % | SYSTOLIC BLOOD PRESSURE: 151 MMHG | WEIGHT: 280 LBS | HEART RATE: 89 BPM

## 2024-04-30 DIAGNOSIS — M79.672 FOOT PAIN, BILATERAL: ICD-10-CM

## 2024-04-30 DIAGNOSIS — M79.671 FOOT PAIN, BILATERAL: ICD-10-CM

## 2024-04-30 DIAGNOSIS — M72.2 PLANTAR FASCIITIS: Primary | ICD-10-CM

## 2024-04-30 PROCEDURE — 1160F RVW MEDS BY RX/DR IN RCRD: CPT | Performed by: PODIATRIST

## 2024-04-30 PROCEDURE — 99213 OFFICE O/P EST LOW 20 MIN: CPT | Performed by: PODIATRIST

## 2024-04-30 PROCEDURE — 1159F MED LIST DOCD IN RCRD: CPT | Performed by: PODIATRIST

## 2024-04-30 RX ORDER — DICLOFENAC SODIUM 75 MG/1
75 TABLET, DELAYED RELEASE ORAL 2 TIMES DAILY
Qty: 60 TABLET | Refills: 1 | Status: SHIPPED | OUTPATIENT
Start: 2024-04-30 | End: 2024-05-30

## 2024-04-30 RX ORDER — METHYLPREDNISOLONE 4 MG/1
TABLET ORAL
Qty: 21 TABLET | Refills: 0 | Status: SHIPPED | OUTPATIENT
Start: 2024-04-30

## 2024-04-30 NOTE — PROGRESS NOTES
[facility] - PODIATRY    Today's Date: 04/30/24    Patient Name: Иван Kearney  MRN: [id number]  CSN: [id number]  PCP: David Markham DO  Referring Provider: No ref. provider found    SUBJECTIVE     Chief Complaint   Patient presents with    Left Foot - Follow-up, Pain, Plantar Fasciitis     He says he is some better    Right Foot - Pain, Plantar Fasciitis     HPI: Иван Kearney, a 54 y.o.male, comes to clinic.    New, Established, New Problem:  est    Location: Bilateral heels    Duration:  1.5 years    Onset:  gradual    Nature:  achy, sharp, shooting    Aggravating factors:  Patient describes morning bilateral heel pain as stabbing, burning, or aching. This pain usually subsides throughout the day, however it returns after periods of rest and sitting, when standing back up on their feet, and again the next morning.      Previous Treatment:  Medrol pack, NSAIDs    Patient denies any fevers, chills, nausea, vomiting, shortness of breath, nor any other constitutional signs nor symptoms.    Patient states recurrence of symptoms after being on his feet a great deal recently.    I have reviewed/confirmed previously documented HPI with no changes.       Past Medical History:   Diagnosis Date    Allergies     Anxiety     Arthritis     Broken bones     Deafness     Depression     Foot pain, bilateral     Head injury 2007    NO RESIDUAL    Heel pain, bilateral     Hemorrhoids     Kidney stones 2013    Migraine     Night sweats     Psychiatric illness 2015    PTSD (post-traumatic stress disorder)     Sinus trouble     Skin disease      Past Surgical History:   Procedure Laterality Date    BACK SURGERY      BRAIN SURGERY  2015    SKULL FX    COLONOSCOPY  05/20/2019    [address] [place] [address] IN 2019-REPEAT IN 5 YEARS    HEMORRHOIDECTOMY N/A 4/3/2024    Procedure: HEMORRHOIDECTOMY;  Surgeon: Kirill Quinn MD;  Location: [facility] [place];  Service: General;  Laterality: N/A;    LEG SURGERY Right      BROKEN    SPINE SURGERY  2015    FUSED    URETEROSCOPY      STONE REMOVAL    WRIST SURGERY Right 2009    BROKEN     Family History   Problem Relation Age of Onset    Breast cancer Mother 27     Social History     Socioeconomic History    Marital status:    Tobacco Use    Smoking status: Former     Current packs/day: 0.00     Average packs/day: 1 pack/day for 33.0 years (33.0 ttl pk-yrs)     Types: Cigarettes     Start date:      Quit date:      Years since quittin.3    Smokeless tobacco: Current     Types: Snuff, Chew    Tobacco comments:     QUIT SMOKING 2 YEARS AGO; SMOKELESS FOR 20 YEARS     INSTRUCTED NO TOBACCO PRODUCTS 24 HR PRIOR TO ANESTHESIA PROTOCOL   Vaping Use    Vaping status: Never Used   Substance and Sexual Activity    Alcohol use: Yes     Comment: 3 DRINKS PER WEEK    Drug use: Never    Sexual activity: Defer     No Known Allergies  Current Outpatient Medications   Medication Sig Dispense Refill    brimonidine (ALPHAGAN) 0.15 % ophthalmic solution Administer 1 drop to both eyes Daily As Needed.      busPIRone (BUSPAR) 10 MG tablet Take 0.5 tablets by mouth 2 (Two) Times a Day. 90 tablet 1    Diclofenac Sodium (VOLTAREN) 1 % gel gel Apply 4 g topically to the appropriate area as directed 4 (Four) Times a Day As Needed (neck pain). 100 g 1    HYDROcodone-acetaminophen (NORCO) 5-325 MG per tablet Take 1 tablet by mouth Every 6 (Six) Hours As Needed for Moderate Pain (Pain). 10 tablet 0    metoprolol succinate XL (Toprol XL) 25 MG 24 hr tablet Take 1 tablet by mouth Daily. 90 tablet 3    Refresh Tears 0.5 % solution Administer 1 drop to both eyes 2 (Two) Times a Day As Needed for Dry Eyes. 90 mL 3    SUMAtriptan (IMITREX) 100 MG tablet Take 1 tablet by mouth 1 (One) Time As Needed for Migraine. 30 tablet 3    tiZANidine (ZANAFLEX) 4 MG tablet Take 1 tablet by mouth Every 8 (Eight) Hours As Needed for Muscle Spasms. 90 tablet 1    vitamin D (ERGOCALCIFEROL) 1.25 MG (63026 UT)  capsule capsule Take 1 capsule by mouth Every 7 (Seven) Days. 13 capsule 3    diclofenac (VOLTAREN) 75 MG EC tablet Take 1 tablet by mouth 2 (Two) Times a Day for 30 days. 60 tablet 1    methylPREDNISolone (MEDROL) 4 MG dose pack Take as directed 21 tablet 0     No current facility-administered medications for this visit.     Review of Systems   Constitutional: Negative.    Musculoskeletal:         Bilateral heel pain   All other systems reviewed and are negative.      OBJECTIVE     Vitals:    04/30/24 0829   BP: (!) 151/112   Pulse: 89   Temp: 98.6 °F (37 °C)   SpO2: 97%       PHYSICAL EXAM     Foot/Ankle Exam    GENERAL  Appearance:  appears stated age and obese  Orientation:  AAOx3  Affect:  appropriate  Gait:  unimpaired  Assistance:  independent  Right shoe gear: casual shoe  Left shoe gear: casual shoe    VASCULAR     Right Foot Vascularity   Dorsalis pedis:  2+  Posterior tibial:  2+  Skin temperature:  warm  Edema grading:  None  CFT:  < 3 seconds  Pedal hair growth:  Present  Varicosities:  mild varicosities     Left Foot Vascularity   Dorsalis pedis:  2+  Posterior tibial:  2+  Skin temperature:  warm  Edema grading:  None  CFT:  < 3 seconds  Pedal hair growth:  Present  Varicosities:  mild varicosities     NEUROLOGIC     Right Foot Neurologic   Normal sensation    Light touch sensation: normal  Vibratory sensation: normal  Hot/Cold sensation: normal  Protective Sensation using Leary-Yordy Monofilament:   Sites intact: 10  Sites tested: 10     Left Foot Neurologic   Normal sensation    Light touch sensation: normal  Vibratory sensation: normal  Hot/Cold sensation:  normal  Protective Sensation using Leary-Yordy Monofilament:   Sites intact: 10  Sites tested: 10    MUSCULOSKELETAL     Right Foot Musculoskeletal   Ecchymosis:  none  Tenderness:  plantar fascia tenderness    Arch:  Pes planus     Left Foot Musculoskeletal   Ecchymosis:  none  Tenderness:  plantar fascia tenderness  Arch:  Pes  planus  Hallux valgus: Yes      MUSCLE STRENGTH     Right Foot Muscle Strength   Foot dorsiflexion:  4  Foot plantar flexion:  4  Foot inversion:  4  Foot eversion:  4     Left Foot Muscle Strength   Foot dorsiflexion:  4  Foot plantar flexion:  4  Foot inversion:  4  Foot eversion:  4    RANGE OF MOTION     Right Foot Range of Motion   Foot and ankle ROM within normal limits       Left Foot Range of Motion   Foot and ankle ROM within normal limits      DERMATOLOGIC      Right Foot Dermatologic   Skin  Right foot skin is intact.   Nails comment:  Toenails 1, 2, 3, 4, and 5     Left Foot Dermatologic   Skin  Left foot skin is intact.   Nails comment:  Toenails 1, 2, 3, 4, and 5    I have reexamined the patient the results are consistent with the previously documented exam.     ASSESSMENT/PLAN     Diagnoses and all orders for this visit:    1. Plantar fasciitis (Primary)  -     Ambulatory Referral For Orthotics  -     methylPREDNISolone (MEDROL) 4 MG dose pack; Take as directed  Dispense: 21 tablet; Refill: 0  -     diclofenac (VOLTAREN) 75 MG EC tablet; Take 1 tablet by mouth 2 (Two) Times a Day for 30 days.  Dispense: 60 tablet; Refill: 1    2. Foot pain, bilateral    Prescription written for custom made orthotics.    Comprehensive lower extremity examination and evaluation was performed.    Discussed findings and treatment plan including risks, benefits, and treatment options with patient in detail. Patient agreed with treatment plan.    Patient tolerated the procedure well with no immediate complications.    Treatment Options discussed:  - no treatment at all  - change in shoegear  - change in activities  - RICE therapy  - arch support  - NSAIDs  - PO steroids  - injectable steroids    Rice Therapy: It is important to treat any injury as soon as possible to help control swelling and increase recovery time. The recognized regimen for immediate treatment of sport injuries includes rest, ice (cold application),  compression, and elevation (RICE). Remove the injured athlete from play, apply ice to the affected area, wrap or compress the injured area with an elastic bandage when appropriate, and elevate the injured area above heart level to reduce swelling.  The patient is to not use ice for longer than 20 minutes at a time, with at least 20 minutes of no ice usage between applications.  The patient states understanding and agreement with this plan.    Patient instructed use OTC analgesics with dosing per package insert as needed.  Patient states understanding and agreement with this plan.    An After Visit Summary was printed and given to the patient at discharge, including (if requested) any available informative/educational handouts regarding diagnosis, treatment, or medications. All questions were answered to patient/family satisfaction. Should symptoms fail to improve or worsen they agree to call or return to clinic or to go to the Emergency Department. Discussed the importance of following up with any needed screening tests/labs/specialist appointments and any requested follow-up recommended by me today. Importance of maintaining follow-up discussed and patient accepts that missed appointments can delay diagnosis and potentially lead to worsening of conditions.    Return if symptoms worsen or fail to improve., or sooner if acute issues arise.    I have reviewed the assessment and plan and verified the accuracy of it. No changes to assessment and plan since the information was documented. Jeremy Lorenzo DPM 04/30/24     I have dictated this note utilizing Dragon Dictation.  Please note that portions of this note were completed with a voice recognition program.  Part of this note may be an electronic transcription/translation of spoken language to printed text using the Dragon Dictation System.      This document has been electronically signed by Jeremy Lorenzo DPM on April 30, 2024 08:58 EDT

## 2024-06-04 ENCOUNTER — OFFICE VISIT (OUTPATIENT)
Dept: FAMILY MEDICINE CLINIC | Facility: CLINIC | Age: 54
End: 2024-06-04
Payer: MEDICARE

## 2024-06-04 VITALS
HEART RATE: 94 BPM | DIASTOLIC BLOOD PRESSURE: 96 MMHG | HEIGHT: 68 IN | TEMPERATURE: 97.9 F | BODY MASS INDEX: 43.78 KG/M2 | WEIGHT: 288.9 LBS | OXYGEN SATURATION: 94 % | SYSTOLIC BLOOD PRESSURE: 150 MMHG

## 2024-06-04 DIAGNOSIS — G43.809 OTHER MIGRAINE WITHOUT STATUS MIGRAINOSUS, NOT INTRACTABLE: ICD-10-CM

## 2024-06-04 DIAGNOSIS — M72.2 PLANTAR FASCIITIS, LEFT: ICD-10-CM

## 2024-06-04 DIAGNOSIS — I10 PRIMARY HYPERTENSION: Primary | ICD-10-CM

## 2024-06-04 DIAGNOSIS — M72.2 PLANTAR FASCIITIS, RIGHT: ICD-10-CM

## 2024-06-04 DIAGNOSIS — E55.9 VITAMIN D DEFICIENCY: ICD-10-CM

## 2024-06-04 DIAGNOSIS — M79.672 FOOT PAIN, BILATERAL: ICD-10-CM

## 2024-06-04 DIAGNOSIS — K64.4 EXTERNAL HEMORRHOID: ICD-10-CM

## 2024-06-04 DIAGNOSIS — M79.671 FOOT PAIN, BILATERAL: ICD-10-CM

## 2024-06-04 DIAGNOSIS — M72.2 PLANTAR FASCIITIS: ICD-10-CM

## 2024-06-04 PROCEDURE — 1125F AMNT PAIN NOTED PAIN PRSNT: CPT | Performed by: FAMILY MEDICINE

## 2024-06-04 PROCEDURE — 99214 OFFICE O/P EST MOD 30 MIN: CPT | Performed by: FAMILY MEDICINE

## 2024-06-04 RX ORDER — METHYLPREDNISOLONE 4 MG/1
TABLET ORAL
Qty: 21 TABLET | Refills: 0 | Status: SHIPPED | OUTPATIENT
Start: 2024-06-04

## 2024-06-04 NOTE — PROGRESS NOTES
Chief Complaint  Hypertension  Migraine HA    Subjective          Иван Kearney presents to [facility] FAMILY MEDICINE  History of Present Illness  Patient presents today to follow-up for hypertension as well as migraine headaches.  Blood pressure is elevated today at 150/96.  Patient reports having a migraine headache today.  He admits that it is the weather changes causing the issue for him.  Imitrex does help out.  I did discuss with him options today including increasing the metoprolol but he would like to hold off at this time.  Also discussed with him that this would be beneficial for his blood pressure but again he does not want to increase the dosage today.  I have encouraged him to keep track of blood pressure at home and to let me know if his blood pressure still running elevated.  He does have ongoing issues with plantar fasciitis and is now seeing podiatry.  A Medrol Dosepak has previously helped out and he is requesting a refill on this.  This was previously prescribed by Dr. Lorenzo.  He had treatment for an external hemorrhoid.  He is doing better in this regard.  Surgery was done on 4/3/2024 by Dr. Quinn.  He is due for labs but would like to come back another day to get these done.    Current Outpatient Medications   Medication Instructions    brimonidine (ALPHAGAN) 0.15 % ophthalmic solution 1 drop, Both Eyes, Daily PRN    busPIRone (BUSPAR) 5 mg, Oral, 2 Times Daily    Diclofenac Sodium (VOLTAREN) 4 g, Topical, 4 Times Daily PRN    HYDROcodone-acetaminophen (NORCO) 5-325 MG per tablet 1 tablet, Oral, Every 6 Hours PRN    methylPREDNISolone (MEDROL) 4 MG dose pack Take as directed    metoprolol succinate XL (TOPROL XL) 25 mg, Oral, Daily    Refresh Tears 0.5 % solution 1 drop, Both Eyes, 2 Times Daily PRN    SUMAtriptan (IMITREX) 100 mg, Oral, Once As Needed    tiZANidine (ZANAFLEX) 4 mg, Oral, Every 8 Hours PRN    vitamin D (ERGOCALCIFEROL) 50,000 Units, Oral, Every 7  "Days       The following portions of the patient's history were reviewed and updated as appropriate: allergies, current medications, past family history, past medical history, past social history, past surgical history, and problem list.    Objective   Vital Signs:   /96   Pulse 94   Temp 97.9 °F (36.6 °C)   Ht 172.7 cm (68\")   Wt 131 kg (288 lb 14.4 oz)   SpO2 94%   BMI 43.93 kg/m²     BP Readings from Last 3 Encounters:   06/04/24 150/96   04/30/24 (!) 151/112   04/03/24 (!) 151/101     Wt Readings from Last 3 Encounters:   06/04/24 131 kg (288 lb 14.4 oz)   04/30/24 127 kg (280 lb)   04/16/24 131 kg (288 lb)     Class 3 Severe Obesity (BMI >=40). Obesity-related health conditions include the following: hypertension. Obesity is worsening. BMI is is above average; BMI management plan is completed. We discussed portion control and increasing exercise.     Physical Exam  Vitals reviewed.   Constitutional:       Appearance: Normal appearance.   HENT:      Head: Normocephalic and atraumatic.      Right Ear: External ear normal.      Left Ear: External ear normal.   Eyes:      Conjunctiva/sclera: Conjunctivae normal.   Cardiovascular:      Rate and Rhythm: Normal rate and regular rhythm.      Heart sounds: No murmur heard.     No friction rub. No gallop.   Pulmonary:      Effort: Pulmonary effort is normal.      Breath sounds: Normal breath sounds. No wheezing or rhonchi.   Abdominal:      General: Bowel sounds are normal. There is no distension.      Palpations: Abdomen is soft.      Tenderness: There is no abdominal tenderness.   Skin:     General: Skin is warm and dry.   Neurological:      Mental Status: He is alert and oriented to person, place, and time.      Cranial Nerves: No cranial nerve deficit.   Psychiatric:         Mood and Affect: Mood and affect normal.         Behavior: Behavior normal.         Thought Content: Thought content normal.         Judgment: Judgment normal.            Result " Review :   The following data was reviewed by: David Markham DO on 06/04/2024:           Lab Results   Component Value Date    COVID19 Detected (C) 12/21/2021    BILIRUBINUR Negative 12/04/2023       Procedures        Assessment and Plan    Diagnoses and all orders for this visit:    1. Primary hypertension (Primary)    2. Vitamin D deficiency    3. Plantar fasciitis  -     methylPREDNISolone (MEDROL) 4 MG dose pack; Take as directed  Dispense: 21 tablet; Refill: 0    4. Foot pain, bilateral    5. External hemorrhoid    6. Other migraine without status migrainosus, not intractable    7. Plantar fasciitis, left    8. Plantar fasciitis, right    Plan as documented above.  I did discuss with patient receiving a Toradol injection today but he would like to hold off.  I discussed with him getting labs done at his convenience.  Plan to see him back in 3 months or sooner if needed.  We did discuss continuing current management for migraine headaches as well as hypertension.  He is encouraged to check his blood pressure at home and to let us know if his blood pressure levels are still elevated.      Medications Discontinued During This Encounter   Medication Reason    methylPREDNISolone (MEDROL) 4 MG dose pack           Follow Up   Return in about 3 months (around 9/4/2024) for migraines.  Patient was given instructions and counseling regarding his condition or for health maintenance advice. Please see specific information pulled into the AVS if appropriate.       David Markham DO  06/04/24  10:04 EDT

## 2024-09-09 ENCOUNTER — TELEPHONE (OUTPATIENT)
Dept: FAMILY MEDICINE CLINIC | Facility: CLINIC | Age: 54
End: 2024-09-09
Payer: MEDICARE

## 2024-09-09 NOTE — TELEPHONE ENCOUNTER
Caller: CARLYNKOMALBRYCE    Relationship: Emergency Contact    Best call back number: [phone]     What medication are you requesting: DICLOFENAC SODIUM EC 75 MG TABLETS QUANTITY 60 TAKEN 2 TIMES DAILY     What are your current symptoms: FOOT PAIN     How long have you been experiencing symptoms: YEARS    Have you had these symptoms before:    [x] Yes  [] No    Have you been treated for these symptoms before:   [x] Yes  [] No    If a prescription is needed, what is your preferred pharmacy and phone number:  [address] [phone] [facility] [phone]        Additional notes:CALLER STATED THAT HAS BEEN PROVIDED FOR PLANTAR FASCITIS, AND THAT THIS MEDICATION WAS PROVIDED BY DR AP FLORES.   THERE ARE NO REFILLS. HAS ONLY 2 DOSES AND NEEDING DR KNIGHT TO PROVIDE REFILLS.

## 2024-09-10 RX ORDER — DICLOFENAC SODIUM 75 MG/1
75 TABLET, DELAYED RELEASE ORAL 2 TIMES DAILY PRN
Qty: 60 TABLET | Refills: 1 | Status: SHIPPED | OUTPATIENT
Start: 2024-09-10

## 2024-09-10 NOTE — TELEPHONE ENCOUNTER
Caller: BRYCE REED    Relationship to patient: Emergency Contact    Best call back number: [phone]     Patient is needing: CALLER REQUESTING UPDATE.    CALLER WOULD LIKE DR. KNIGHT TO TAKE OVER PRESCRIBING SINCE PATIENT IS NO LONGER ESTABLISHED WITH PODIATRY.     PLEASE CONTACT CALLER AS SOON AS IT IS CALLED IN AND OR TO ADVISE.        MYCHART NO, CALL PREFERRED MAY LEAVE VOICEMAIL.

## 2024-09-10 NOTE — TELEPHONE ENCOUNTER
Please let patient know that I will send in the diclofenac tablets for him.  Please have him only use on as-needed basis.  Prescription sent to [facility].

## 2024-09-19 ENCOUNTER — OFFICE VISIT (OUTPATIENT)
Dept: FAMILY MEDICINE CLINIC | Facility: CLINIC | Age: 54
End: 2024-09-19
Payer: MEDICARE

## 2024-09-19 VITALS
HEART RATE: 87 BPM | OXYGEN SATURATION: 94 % | BODY MASS INDEX: 45.22 KG/M2 | WEIGHT: 298.4 LBS | DIASTOLIC BLOOD PRESSURE: 86 MMHG | HEIGHT: 68 IN | TEMPERATURE: 98.2 F | SYSTOLIC BLOOD PRESSURE: 130 MMHG

## 2024-09-19 DIAGNOSIS — I10 PRIMARY HYPERTENSION: Primary | ICD-10-CM

## 2024-09-19 DIAGNOSIS — E55.9 VITAMIN D DEFICIENCY: ICD-10-CM

## 2024-09-19 DIAGNOSIS — Z12.5 SCREENING FOR PROSTATE CANCER: ICD-10-CM

## 2024-09-19 DIAGNOSIS — Z23 NEED FOR INFLUENZA VACCINATION: ICD-10-CM

## 2024-09-19 DIAGNOSIS — E78.00 PURE HYPERCHOLESTEROLEMIA: ICD-10-CM

## 2024-09-19 DIAGNOSIS — R53.83 OTHER FATIGUE: ICD-10-CM

## 2024-09-19 DIAGNOSIS — G43.809 OTHER MIGRAINE WITHOUT STATUS MIGRAINOSUS, NOT INTRACTABLE: ICD-10-CM

## 2024-09-19 DIAGNOSIS — F41.9 ANXIETY: ICD-10-CM

## 2024-09-19 DIAGNOSIS — M72.2 PLANTAR FASCIITIS: ICD-10-CM

## 2024-09-19 DIAGNOSIS — R73.03 PREDIABETES: ICD-10-CM

## 2024-09-19 LAB
25(OH)D3 SERPL-MCNC: 37.6 NG/ML (ref 30–100)
ALBUMIN SERPL-MCNC: 4.3 G/DL (ref 3.5–5.2)
ALBUMIN/GLOB SERPL: 2 G/DL
ALP SERPL-CCNC: 64 U/L (ref 39–117)
ALT SERPL W P-5'-P-CCNC: 29 U/L (ref 1–41)
ANION GAP SERPL CALCULATED.3IONS-SCNC: 13.1 MMOL/L (ref 5–15)
AST SERPL-CCNC: 25 U/L (ref 1–40)
BASOPHILS # BLD AUTO: 0.04 10*3/MM3 (ref 0–0.2)
BASOPHILS NFR BLD AUTO: 0.7 % (ref 0–1.5)
BILIRUB SERPL-MCNC: 0.4 MG/DL (ref 0–1.2)
BUN SERPL-MCNC: 18 MG/DL (ref 6–20)
BUN/CREAT SERPL: 14 (ref 7–25)
CALCIUM SPEC-SCNC: 9.3 MG/DL (ref 8.6–10.5)
CHLORIDE SERPL-SCNC: 105 MMOL/L (ref 98–107)
CHOLEST SERPL-MCNC: 197 MG/DL (ref 0–200)
CO2 SERPL-SCNC: 20.9 MMOL/L (ref 22–29)
CREAT SERPL-MCNC: 1.29 MG/DL (ref 0.76–1.27)
DEPRECATED RDW RBC AUTO: 38.7 FL (ref 37–54)
EGFRCR SERPLBLD CKD-EPI 2021: 65.5 ML/MIN/1.73
EOSINOPHIL # BLD AUTO: 0.21 10*3/MM3 (ref 0–0.4)
EOSINOPHIL NFR BLD AUTO: 3.5 % (ref 0.3–6.2)
ERYTHROCYTE [DISTWIDTH] IN BLOOD BY AUTOMATED COUNT: 12.4 % (ref 12.3–15.4)
GLOBULIN UR ELPH-MCNC: 2.2 GM/DL
GLUCOSE SERPL-MCNC: 88 MG/DL (ref 65–99)
HBA1C MFR BLD: 5.7 % (ref 4.8–5.6)
HCT VFR BLD AUTO: 44.1 % (ref 37.5–51)
HDLC SERPL-MCNC: 50 MG/DL (ref 40–60)
HGB BLD-MCNC: 15.2 G/DL (ref 13–17.7)
IMM GRANULOCYTES # BLD AUTO: 0.02 10*3/MM3 (ref 0–0.05)
IMM GRANULOCYTES NFR BLD AUTO: 0.3 % (ref 0–0.5)
LDLC SERPL CALC-MCNC: 133 MG/DL (ref 0–100)
LDLC/HDLC SERPL: 2.63 {RATIO}
LYMPHOCYTES # BLD AUTO: 2.27 10*3/MM3 (ref 0.7–3.1)
LYMPHOCYTES NFR BLD AUTO: 37.3 % (ref 19.6–45.3)
MCH RBC QN AUTO: 30 PG (ref 26.6–33)
MCHC RBC AUTO-ENTMCNC: 34.5 G/DL (ref 31.5–35.7)
MCV RBC AUTO: 87.2 FL (ref 79–97)
MONOCYTES # BLD AUTO: 0.6 10*3/MM3 (ref 0.1–0.9)
MONOCYTES NFR BLD AUTO: 9.9 % (ref 5–12)
NEUTROPHILS NFR BLD AUTO: 2.94 10*3/MM3 (ref 1.7–7)
NEUTROPHILS NFR BLD AUTO: 48.3 % (ref 42.7–76)
NRBC BLD AUTO-RTO: 0 /100 WBC (ref 0–0.2)
PLATELET # BLD AUTO: 309 10*3/MM3 (ref 140–450)
PMV BLD AUTO: 10.9 FL (ref 6–12)
POTASSIUM SERPL-SCNC: 3.9 MMOL/L (ref 3.5–5.2)
PROT SERPL-MCNC: 6.5 G/DL (ref 6–8.5)
PSA SERPL-MCNC: 0.81 NG/ML (ref 0–4)
RBC # BLD AUTO: 5.06 10*6/MM3 (ref 4.14–5.8)
SODIUM SERPL-SCNC: 139 MMOL/L (ref 136–145)
T4 FREE SERPL-MCNC: 1.24 NG/DL (ref 0.92–1.68)
TRIGL SERPL-MCNC: 77 MG/DL (ref 0–150)
TSH SERPL DL<=0.05 MIU/L-ACNC: 2.29 UIU/ML (ref 0.27–4.2)
VLDLC SERPL-MCNC: 14 MG/DL (ref 5–40)
WBC NRBC COR # BLD AUTO: 6.08 10*3/MM3 (ref 3.4–10.8)

## 2024-09-19 PROCEDURE — 80053 COMPREHEN METABOLIC PANEL: CPT | Performed by: FAMILY MEDICINE

## 2024-09-19 PROCEDURE — 82306 VITAMIN D 25 HYDROXY: CPT | Performed by: FAMILY MEDICINE

## 2024-09-19 PROCEDURE — 80061 LIPID PANEL: CPT | Performed by: FAMILY MEDICINE

## 2024-09-19 PROCEDURE — 84443 ASSAY THYROID STIM HORMONE: CPT | Performed by: FAMILY MEDICINE

## 2024-09-19 PROCEDURE — 83036 HEMOGLOBIN GLYCOSYLATED A1C: CPT | Performed by: FAMILY MEDICINE

## 2024-09-19 PROCEDURE — 84439 ASSAY OF FREE THYROXINE: CPT | Performed by: FAMILY MEDICINE

## 2024-09-19 PROCEDURE — G0103 PSA SCREENING: HCPCS | Performed by: FAMILY MEDICINE

## 2024-09-19 PROCEDURE — 85025 COMPLETE CBC W/AUTO DIFF WBC: CPT | Performed by: FAMILY MEDICINE

## 2024-09-19 PROCEDURE — 1125F AMNT PAIN NOTED PAIN PRSNT: CPT | Performed by: FAMILY MEDICINE

## 2024-09-19 PROCEDURE — 99214 OFFICE O/P EST MOD 30 MIN: CPT | Performed by: FAMILY MEDICINE

## 2024-09-19 RX ORDER — BUSPIRONE HYDROCHLORIDE 10 MG/1
5 TABLET ORAL 2 TIMES DAILY
Qty: 90 TABLET | Refills: 3 | Status: SHIPPED | OUTPATIENT
Start: 2024-09-19

## 2024-09-19 RX ORDER — SUMATRIPTAN 100 MG/1
100 TABLET, FILM COATED ORAL ONCE AS NEEDED
Qty: 30 TABLET | Refills: 3 | Status: SHIPPED | OUTPATIENT
Start: 2024-09-19

## 2024-09-19 RX ORDER — ERGOCALCIFEROL 1.25 MG/1
50000 CAPSULE, LIQUID FILLED ORAL
Qty: 13 CAPSULE | Refills: 3 | Status: SHIPPED | OUTPATIENT
Start: 2024-09-19

## 2024-09-19 RX ORDER — METOPROLOL SUCCINATE 25 MG/1
25 TABLET, EXTENDED RELEASE ORAL DAILY
Qty: 90 TABLET | Refills: 3 | Status: SHIPPED | OUTPATIENT
Start: 2024-09-19

## 2024-10-02 DIAGNOSIS — L98.9 SKIN LESIONS: ICD-10-CM

## 2024-10-02 DIAGNOSIS — Z85.828 HISTORY OF SKIN CANCER: Primary | ICD-10-CM

## 2024-11-11 ENCOUNTER — TELEPHONE (OUTPATIENT)
Dept: FAMILY MEDICINE CLINIC | Facility: CLINIC | Age: 54
End: 2024-11-11
Payer: MEDICARE

## 2024-11-11 NOTE — TELEPHONE ENCOUNTER
Caller: BRYCE REED    Relationship: Emergency Contact    Best call back number: [phone]         If a prescription is needed, what is your preferred pharmacy and phone number:      [facility] - [place], [place] - [address] - [phone]  - [phone]  [phone]       Additional notes:     THE PATIENT'S WIFE  SAID THAT HE AND DR KNIGHT TALKED ABOUT BLOOD PRESSURE MEDICATION AND SHE SAID HE IS WANTING TO GET THAT MEDICATION SENT TO THE PHARMACY.      SHE IS REQUESTING FOR A CALL THE PATIENT TO ADVISED THAT THIS WILL BE DONE

## 2024-11-12 RX ORDER — METOPROLOL SUCCINATE 50 MG/1
50 TABLET, EXTENDED RELEASE ORAL DAILY
Qty: 90 TABLET | Refills: 0 | Status: SHIPPED | OUTPATIENT
Start: 2024-11-12

## 2024-11-12 NOTE — TELEPHONE ENCOUNTER
I will go ahead and increase the metoprolol to take 50 mg which is up from the previous 25 mg as previously discussed.  Prescription has been sent to [facility].

## 2024-11-22 ENCOUNTER — OFFICE VISIT (OUTPATIENT)
Dept: FAMILY MEDICINE CLINIC | Facility: CLINIC | Age: 54
End: 2024-11-22
Payer: MEDICARE

## 2024-11-22 VITALS
BODY MASS INDEX: 44.54 KG/M2 | HEIGHT: 68 IN | TEMPERATURE: 97.9 F | HEART RATE: 110 BPM | DIASTOLIC BLOOD PRESSURE: 100 MMHG | SYSTOLIC BLOOD PRESSURE: 160 MMHG | OXYGEN SATURATION: 95 % | WEIGHT: 293.9 LBS

## 2024-11-22 DIAGNOSIS — J01.00 ACUTE NON-RECURRENT MAXILLARY SINUSITIS: ICD-10-CM

## 2024-11-22 DIAGNOSIS — F17.200 TOBACCO USE DISORDER: ICD-10-CM

## 2024-11-22 DIAGNOSIS — Z00.00 MEDICARE ANNUAL WELLNESS VISIT, INITIAL: Primary | ICD-10-CM

## 2024-11-22 DIAGNOSIS — Z12.2 SCREENING FOR LUNG CANCER: ICD-10-CM

## 2024-11-22 DIAGNOSIS — I10 PRIMARY HYPERTENSION: ICD-10-CM

## 2024-11-22 DIAGNOSIS — Z12.11 ENCOUNTER FOR SCREENING COLONOSCOPY: ICD-10-CM

## 2024-11-22 DIAGNOSIS — J06.9 VIRAL UPPER RESPIRATORY TRACT INFECTION: ICD-10-CM

## 2024-11-22 DIAGNOSIS — E55.9 VITAMIN D DEFICIENCY: ICD-10-CM

## 2024-11-22 PROBLEM — R61 NIGHT SWEATS: Status: ACTIVE | Noted: 2024-11-22

## 2024-11-22 PROBLEM — M19.90 ARTHRITIS: Status: ACTIVE | Noted: 2024-11-22

## 2024-11-22 PROBLEM — S09.90XA HEAD INJURY: Status: ACTIVE | Noted: 2024-11-22

## 2024-11-22 PROBLEM — F32.A DEPRESSION: Status: ACTIVE | Noted: 2024-11-22

## 2024-11-22 PROBLEM — J01.80 OTHER ACUTE SINUSITIS: Status: ACTIVE | Noted: 2024-11-22

## 2024-11-22 PROBLEM — F43.10 POST TRAUMATIC STRESS DISORDER (PTSD): Status: ACTIVE | Noted: 2024-11-22

## 2024-11-22 PROBLEM — F41.9 ANXIETY: Status: ACTIVE | Noted: 2024-11-22

## 2024-11-22 PROBLEM — K64.9 HEMORRHOIDS: Status: ACTIVE | Noted: 2023-12-22

## 2024-11-22 PROBLEM — N20.0 KIDNEY STONES: Status: ACTIVE | Noted: 2024-11-22

## 2024-11-22 PROBLEM — G43.909 MIGRAINE: Status: ACTIVE | Noted: 2024-11-22

## 2024-11-22 RX ORDER — LOSARTAN POTASSIUM 25 MG/1
25 TABLET ORAL DAILY
Qty: 90 TABLET | Refills: 1 | Status: SHIPPED | OUTPATIENT
Start: 2024-11-22

## 2024-11-22 RX ORDER — IPRATROPIUM BROMIDE 42 UG/1
1 SPRAY, METERED NASAL 2 TIMES DAILY
Qty: 15 ML | Refills: 0 | Status: SHIPPED | OUTPATIENT
Start: 2024-11-22

## 2024-11-22 NOTE — PROGRESS NOTES
Subjective   The ABCs of the Annual Wellness Visit  Medicare Wellness Visit      Иван Kearney is a 54 y.o. patient who presents for a Medicare Wellness Visit.    The following portions of the patient's history were reviewed and   updated as appropriate: allergies, current medications, past family history, past medical history, past social history, past surgical history, and problem list.    Compared to one year ago, the patient's physical   health is the same.  Compared to one year ago, the patient's mental   health is the same.    Recent Hospitalizations:  He was not admitted to the hospital during the last year.     Current Medical Providers:  Patient Care Team:  David Markham DO as PCP - General (Family Medicine)    Outpatient Medications Prior to Visit   Medication Sig Dispense Refill    brimonidine (ALPHAGAN) 0.15 % ophthalmic solution Administer 1 drop to both eyes Daily As Needed.      busPIRone (BUSPAR) 10 MG tablet Take 0.5 tablets by mouth 2 (Two) Times a Day. 90 tablet 3    diclofenac (VOLTAREN) 75 MG EC tablet Take 1 tablet by mouth 2 (Two) Times a Day As Needed (pain). 60 tablet 1    Diclofenac Sodium (VOLTAREN) 1 % gel gel Apply 4 g topically to the appropriate area as directed 4 (Four) Times a Day As Needed (neck pain). 100 g 1    metoprolol succinate XL (Toprol XL) 50 MG 24 hr tablet Take 1 tablet by mouth Daily. 90 tablet 0    Refresh Tears 0.5 % solution Administer 1 drop to both eyes 2 (Two) Times a Day As Needed for Dry Eyes. 90 mL 3    SUMAtriptan (IMITREX) 100 MG tablet Take 1 tablet by mouth 1 (One) Time As Needed for Migraine. 30 tablet 3    tiZANidine (ZANAFLEX) 4 MG tablet Take 1 tablet by mouth Every 8 (Eight) Hours As Needed for Muscle Spasms. 90 tablet 1    vitamin D (ERGOCALCIFEROL) 1.25 MG (27169 UT) capsule capsule Take 1 capsule by mouth Every 7 (Seven) Days. 13 capsule 3    HYDROcodone-acetaminophen (NORCO) 5-325 MG per tablet Take 1 tablet by mouth Every 6 (Six) Hours  "As Needed for Moderate Pain (Pain). 10 tablet 0     No facility-administered medications prior to visit.     No opioid medication identified on active medication list. I have reviewed chart for other potential  high risk medication/s and harmful drug interactions in the elderly.      Aspirin is not on active medication list.  Aspirin use is not indicated based on review of current medical condition/s. Risk of harm outweighs potential benefits.  .    Patient Active Problem List   Diagnosis    Screening for colon cancer    Hemorrhoids    Primary hypertension    Vitamin D deficiency    Head injury    Night sweats    Migraine    Post traumatic stress disorder (PTSD)    Other acute sinusitis    Kidney stones    Head injury    Arthritis    Anxiety    Depression     Advance Care Planning Advance Directive is not on file.  ACP discussion was held with the patient during this visit. Patient does not have an advance directive, information provided.            Objective   Vitals:    11/22/24 0810   BP: 153/100   BP Location: Left arm   Patient Position: Sitting   Pulse: 110   Temp: 97.9 °F (36.6 °C)   TempSrc: Oral   SpO2: 95%   Weight: 133 kg (293 lb 14.4 oz)   Height: 172.7 cm (68\")   PainSc:   4       Estimated body mass index is 44.69 kg/m² as calculated from the following:    Height as of this encounter: 172.7 cm (68\").    Weight as of this encounter: 133 kg (293 lb 14.4 oz).              Gait and Balance Evaluation:  Normal  Does the patient have evidence of cognitive impairment? Yes  Lab Results   Component Value Date    TRIG 77 09/19/2024    HDL 50 09/19/2024     (H) 09/19/2024    VLDL 14 09/19/2024    HGBA1C 5.70 (H) 09/19/2024                                                                                                Health  Risk Assessment    Smoking Status:  Social History     Tobacco Use   Smoking Status Former    Current packs/day: 0.00    Average packs/day: 1 pack/day for 33.0 years (33.0 ttl pk-yrs) "    Types: Cigarettes    Start date:     Quit date:     Years since quittin.8   Smokeless Tobacco Current    Types: Snuff, Chew   Tobacco Comments    QUIT SMOKING 2 YEARS AGO; SMOKELESS FOR 20 YEARS    INSTRUCTED NO TOBACCO PRODUCTS 24 HR PRIOR TO ANESTHESIA PROTOCOL     Alcohol Consumption:  Social History     Substance and Sexual Activity   Alcohol Use Yes    Comment: 3 DRINKS PER WEEK       Fall Risk Screen  LEELA Fall Risk Assessment was completed, and patient is at LOW risk for falls.Assessment completed on:2024    Depression Screening   Little interest or pleasure in doing things? Not at all   Feeling down, depressed, or hopeless? Not at all   PHQ-2 Total Score 0      Health Habits and Functional and Cognitive Screenin/22/2024     8:17 AM   Functional & Cognitive Status   Do you have difficulty preparing food and eating? No   Do you have difficulty bathing yourself, getting dressed or grooming yourself? No   Do you have difficulty using the toilet? No   Do you have difficulty moving around from place to place? No   Do you have trouble with steps or getting out of a bed or a chair? No   Current Diet Limited Junk Food   Dental Exam Not up to date   Eye Exam Up to date   Exercise (times per week) 0 times per week   Current Exercises Include No Regular Exercise   Do you need help using the phone?  No   Are you deaf or do you have serious difficulty hearing?  No   Do you need help to go to places out of walking distance? No   Do you need help shopping? No   Do you need help preparing meals?  No   Do you need help with housework?  No   Do you need help with laundry? No   Do you need help taking your medications? No   Do you need help managing money? No   Do you ever drive or ride in a car without wearing a seat belt? No   Have you felt unusual stress, anger or loneliness in the last month? No   Who do you live with? Spouse   If you need help, do you have trouble finding someone available  to you? No   Have you been bothered in the last four weeks by sexual problems? No   Do you have difficulty concentrating, remembering or making decisions? Yes           Visual Acuity:  No results found.  Age-appropriate Screening Schedule:  Refer to the list below for future screening recommendations based on patient's age, sex and/or medical conditions. Orders for these recommended tests are listed in the plan section. The patient has been provided with a written plan.    Health Maintenance List  Health Maintenance   Topic Date Due    ZOSTER VACCINE (1 of 2) Never done    LUNG CANCER SCREENING  Never done    COVID-19 Vaccine (1 - 2024-25 season) 11/24/2024 (Originally 9/1/2024)    INFLUENZA VACCINE  03/31/2025 (Originally 8/1/2024)    BMI FOLLOWUP  06/04/2025    LIPID PANEL  09/19/2025    ANNUAL WELLNESS VISIT  11/22/2025    TDAP/TD VACCINES (2 - Td or Tdap) 02/07/2033    HEPATITIS C SCREENING  Completed    Pneumococcal Vaccine 0-64  Aged Out    COLORECTAL CANCER SCREENING  Discontinued                                                                                                                                                CMS Preventative Services Quick Reference  Risk Factors Identified During Encounter  Alcohol Misuse: Patient encouraged to limit alcohol use to no more than 1 standard alcoholic beverage per day. (12 ounce beer, 6 ounce wine, one shot liquor)  Immunizations Discussed/Encouraged: Influenza, Shingrix, and COVID19  Tobacco Use/Dependance Risk (use dotphrase .tobaccocessation for documentation)  Иван Kearney  reports that he quit smoking about 9 years ago. His smoking use included cigarettes. He started smoking about 42 years ago. He has a 33 pack-year smoking history. His smokeless tobacco use includes snuff and chew. I have educated him on the risk of diseases from using tobacco products such as cancer.     I advised him to quit and he is not willing to quit.    I spent 3  minutes  counseling the patient.          The above risks/problems have been discussed with the patient.  Pertinent information has been shared with the patient in the After Visit Summary.  An After Visit Summary and PPPS were made available to the patient.    Follow Up:   Next Medicare Wellness visit to be scheduled in 1 year.          Additional E&M Note during same encounter follows:  Patient has multiple medical problems which are significant and separately identifiable that require additional work above and beyond the Medicare Wellness Visit.      Chief Complaint  Annual Exam (WELLNESS CHECK-UP)    Иван Kearney is a 54 y.o. male who presents to [facility]     History of Present Illness  The patient is a 54-year-old male who presents for a Medicare wellness visit.    He has been experiencing high blood pressure, with a recent reading of 197/115. He was advised to take two metoprolol tablets and consult his doctor. His prescription was subsequently adjusted from 25 mg to 50 mg. Despite taking his medication 1.5 hours ago, his blood pressure remains elevated. He has not monitored his blood pressure this week due to feeling unwell, but recalls readings around 150s/100. His migraines have not improved with the increased metoprolol dosage. He also reports a current headache but has no vision changes. He has a history of migraines and underwent a procedure in 2015 to address pressure issues. He has not had a heart attack or stroke. He is concerned about potential weight gain from additional medication. He consumes alcohol regularly and continues to chew tobacco.    He experiences sinus infections annually, typically in the fall or spring, accompanied by headaches, congestion, and body aches. He does not exhibit flu-like symptoms such as fever or vomiting. Currently, he has mild chest congestion and has been managing it with rest and tea. His symptoms have persisted for about a week,  including sinus pressure and a cough. He does not have asthma or known allergies. He also reports occasional ear pressure when sneezing, which takes time to subside due to increased congestion. His symptoms fluctuate throughout the day.    He underwent a colonoscopy in 2019 and is due for a repeat procedure. He expresses reluctance to undergo another colonoscopy due to the unpleasant taste of the GoLYTELY preparation. He is open to alternatives such as MiraLAX or Gatorade. He has not yet received the shingles vaccine.    He missed a scheduled CT scan of the chest due to a missed phone call.     He has plantar fasciitis and is taking tizanidine, which provides significant relief. He also takes diclofenac twice daily for pain management. He reports that his physical and mental health have remained stable over the past year. He has not been hospitalized overnight in the past year. He has no advanced care directives in place. He has not experienced any falls in the past year. He reports memory issues, often misplacing items and forgetting conversations. He has chronic back pain, which is managed with medication. He also experiences intermittent neck pain, which he attributes to arthritis. He has difficulty sleeping and often stays up late.    He has anxiety, arthritis, several broken bones, depression, some foot pain, a head injury in 2007, heel pain, hemorrhoids, kidney stones, PTSD, and sinus issues. He had back surgery, brain surgery with skull fracture, hemorrhoid surgery, spine surgery, stone removal from the ureter, and wrist surgery. He is on buspirone twice a day for anxiety, eyedrops, sumatriptan for migraines, and vitamin D once a week.    SOCIAL HISTORY  He drinks a few beers on Friday nights and Sunday afternoon and sometimes on the weekend if he goes out to shoot. He is still chewing tobacco and does not want to stop.    FAMILY HISTORY  His mother had breast cancer at a very young age. He is not aware of  "any medical history in his father.    Objective   Vital Signs:   Vitals:    11/22/24 0810   BP: 153/100   BP Location: Left arm   Patient Position: Sitting   Pulse: 110   Temp: 97.9 °F (36.6 °C)   TempSrc: Oral   SpO2: 95%   Weight: 133 kg (293 lb 14.4 oz)   Height: 172.7 cm (68\")   PainSc:   4       Wt Readings from Last 3 Encounters:   11/22/24 133 kg (293 lb 14.4 oz)   09/19/24 135 kg (298 lb 6.4 oz)   06/04/24 131 kg (288 lb 14.4 oz)     BP Readings from Last 3 Encounters:   11/22/24 153/100   09/19/24 130/86   06/04/24 150/96       Physical Exam  HENT:      Head: Normocephalic and atraumatic.      Nose: Nose normal. Congestion present.      Comments: No tenderness to palpation of the sinuses.     Mouth/Throat:      Mouth: Mucous membranes are moist.      Pharynx: Posterior oropharyngeal erythema present. No oropharyngeal exudate.   Eyes:      Extraocular Movements: Extraocular movements intact.      Conjunctiva/sclera: Conjunctivae normal.   Cardiovascular:      Rate and Rhythm: Normal rate and regular rhythm.      Heart sounds: No murmur heard.     No friction rub. No gallop.   Pulmonary:      Effort: No respiratory distress.      Breath sounds: No wheezing, rhonchi or rales.   Abdominal:      General: Abdomen is flat. There is no distension.   Musculoskeletal:         General: No swelling.      Cervical back: Neck supple.   Skin:     General: Skin is warm and dry.   Neurological:      General: No focal deficit present.      Mental Status: He is alert and oriented to person, place, and time.   Psychiatric:         Mood and Affect: Mood normal.         Behavior: Behavior normal.         Thought Content: Thought content normal.         Judgment: Judgment normal.         The following data was reviewed by Srinivasan Negro DO on 11/22/2024        Assessment & Plan   Diagnoses and all orders for this visit:    1. Medicare annual wellness visit, initial (Primary)    2. Acute non-recurrent maxillary sinusitis  -     " amoxicillin-clavulanate (AUGMENTIN) 875-125 MG per tablet; Take 1 tablet by mouth 2 (Two) Times a Day for 5 days.  Dispense: 10 tablet; Refill: 0    3. Primary hypertension  -     losartan (Cozaar) 25 MG tablet; Take 1 tablet by mouth Daily.  Dispense: 90 tablet; Refill: 1    4. Vitamin D deficiency    5. Encounter for screening colonoscopy  -     Ambulatory Referral For Screening Colonoscopy    6. Screening for lung cancer    7. Viral upper respiratory tract infection  -     ipratropium (ATROVENT) 0.06 % nasal spray; Administer 1 spray into the nostril(s) as directed by provider 2 (Two) Times a Day.  Dispense: 15 mL; Refill: 0    8. Tobacco use disorder        Assessment & Plan  1.  Medicare wellness  We discussed screening tests including colonoscopy, low-dose CT scan chest, prostate cancer screening.  Patient recently had prostate cancer screening which was negative.  Patient given phone number to call to schedule his chest CT.  Referral placed for colonoscopy to discuss alternatives other than GoLytely for prep.  Discussed vaccines including flu, COVID, shingles.  Patient declines flu, COVID vaccines today.  We discussed tobacco cessation although patient declines today.  We also discussed reducing alcohol intake and patient declines as well. Mini cog score of 4 today, unable to remember one word. Completed clock draw.     The below problems were identified and addressed separately above and beyond the Medicare wellness visit.    1. Upper respiratory infection.  Symptoms include congestion, headaches, body aches, and cough. Antibiotics are not deemed necessary at this juncture.  Ipratropium nasal spray has been prescribed for use in both nostrils twice daily, primarily at night before bedtime. Augmentin has also been prescribed, but its use is to be deferred for a few days while the nasal spray is being used. If symptoms improve with the nasal spray, the Augmentin prescription should not be filled. However, if  symptoms persist or worsen over the next few days, the Augmentin should be initiated.    2. Hypertension.  Elevated blood pressure and heart rate were noted during the visit. The headaches could be attributed to the high blood pressure rather than migraines. Losartan 25 mg has been prescribed to manage the blood pressure until the next appointment with Dr. Markham on January 9.  Patient is very hesitant about this but agrees to at least try the medicine until he meets with PCP.  We also discussed taking blood pressure at home regularly.      Follow-up  Return in 1 month for follow up.            FOLLOW UP  Return for Next scheduled follow up.  Patient was given instructions and counseling regarding his condition or for health maintenance advice. Please see specific information pulled into the AVS if appropriate.     Srinivasan Negro DO  11/22/24  09:22 EST    Patient or patient representative verbalized consent for the use of Ambient Listening during the visit with  Srinivasan Negro DO for chart documentation. 11/22/2024  08:50 EST

## 2024-12-24 RX ORDER — DICLOFENAC SODIUM 75 MG/1
75 TABLET, DELAYED RELEASE ORAL 2 TIMES DAILY PRN
Qty: 60 TABLET | Refills: 1 | Status: SHIPPED | OUTPATIENT
Start: 2024-12-24

## 2024-12-24 NOTE — TELEPHONE ENCOUNTER
Caller: BRYCE REED    Relationship: Emergency Contact    Best call back number: [phone]     Requested Prescriptions:   Requested Prescriptions     Pending Prescriptions Disp Refills    diclofenac (VOLTAREN) 75 MG EC tablet 60 tablet 1     Sig: Take 1 tablet by mouth 2 (Two) Times a Day As Needed (pain).        Pharmacy where request should be sent: [address] [phone] [facility] [phone]      Last office visit with prescribing clinician: 9/19/2024   Last telemedicine visit with prescribing clinician: Visit date not found   Next office visit with prescribing clinician: 1/9/2025     Additional details provided by patient: WIFE WOULD LIKE A CALL BACK WHEN PRESCRIPTION HAS BEEN SENT    Does the patient have less than a 3 day supply:  [x] Yes  [] No    Would you like a call back once the refill request has been completed: [x] Yes [] No    If the office needs to give you a call back, can they leave a voicemail: [x] Yes [] No    Lidia Parr Rep   12/24/24 08:27 EST

## 2025-01-08 ENCOUNTER — PREP FOR SURGERY (OUTPATIENT)
Dept: OTHER | Facility: HOSPITAL | Age: 55
End: 2025-01-08
Payer: OTHER GOVERNMENT

## 2025-01-08 ENCOUNTER — CLINICAL SUPPORT (OUTPATIENT)
Dept: GASTROENTEROLOGY | Facility: CLINIC | Age: 55
End: 2025-01-08
Payer: MEDICARE

## 2025-01-08 DIAGNOSIS — Z86.0100 HX OF COLONIC POLYPS: ICD-10-CM

## 2025-01-08 DIAGNOSIS — Z12.11 ENCOUNTER FOR SCREENING COLONOSCOPY: Primary | ICD-10-CM

## 2025-01-08 RX ORDER — POLYETHYLENE GLYCOL 3350, SODIUM SULFATE, POTASSIUM CHLORIDE, MAGNESIUM SULFATE, AND SODIUM CHLORIDE FOR ORAL SOLUTION 178.7-7.3G
1 KIT ORAL TAKE AS DIRECTED
Qty: 1 EACH | Refills: 0 | Status: SHIPPED | OUTPATIENT
Start: 2025-01-08

## 2025-01-08 NOTE — PROGRESS NOTES
Иван Kearney  1970  55 y.o.    Reason for call: Screening Colonoscopy, [facility] at [facility], Hx Colon Polyps  Prep prescribed: Suflave  Prep instructions reviewed with patient and sent to patient via regular mail to the home address on file  Is the patient currently on any injectable or oral medications for weight loss or diabetes? No  Clearance needed? No  If yes, what clearance is needed? N/A  Clearance has been requested from   The patient has been scheduled for: Colonoscopy   Иван Kearney is aware they have been scheduled for a screening colonoscopy. Patient has expressed they are not having any symptoms at all.   Family history of colon cancer? No  If yes, indicate relative:   Tentative Procedure Date: 2.4.2025  Date/Place of last Scope: 2019 at [facility]  Able to obtain report? no  Family History   Problem Relation Age of Onset    Breast cancer Mother 27    No Known Problems Father     Colon cancer Neg Hx     Esophageal cancer Neg Hx      Past Medical History:   Diagnosis Date    Allergies     Anxiety     Arthritis     Broken bones     Deafness     Depression     Foot pain, bilateral     Head injury 2007    NO RESIDUAL    Heel pain, bilateral     Hemorrhoids     Kidney stones 2013    Migraine     Night sweats     Psychiatric illness 2015    PTSD (post-traumatic stress disorder)     Sinus trouble     Skin disease      No Known Allergies  Past Surgical History:   Procedure Laterality Date    BACK SURGERY      BRAIN SURGERY  2015    SKULL FX    COLONOSCOPY  05/20/2019    3 POLYPS-[facility] IN 2019-REPEAT IN 5 YEARS    HEMORRHOIDECTOMY N/A 4/3/2024    Procedure: HEMORRHOIDECTOMY;  Surgeon: Kirill Quinn MD;  [facility];  Service: General;  Laterality: N/A;    LEG SURGERY Right 1999    BROKEN    SPINE SURGERY  2015    FUSED    URETEROSCOPY      STONE REMOVAL    WRIST SURGERY Right 2009    BROKEN     Social History     Socioeconomic History    Marital status:    Tobacco Use    Smoking  status: Former     Current packs/day: 0.00     Average packs/day: 1 pack/day for 33.0 years (33.0 ttl pk-yrs)     Types: Cigarettes     Start date: 1982     Quit date: 2015     Years since quitting: 10.0     Passive exposure: Past    Smokeless tobacco: Current     Types: Snuff, Chew    Tobacco comments:     QUIT SMOKING 2 YEARS AGO; SMOKELESS FOR 20 YEARS     INSTRUCTED NO TOBACCO PRODUCTS 24 HR PRIOR TO ANESTHESIA PROTOCOL   Vaping Use    Vaping status: Never Used   Substance and Sexual Activity    Alcohol use: Yes     Comment: 3 DRINKS PER WEEK    Drug use: Never    Sexual activity: Defer       Current Outpatient Medications:     brimonidine (ALPHAGAN) 0.15 % ophthalmic solution, Administer 1 drop to both eyes Daily As Needed., Disp: , Rfl:     busPIRone (BUSPAR) 10 MG tablet, Take 0.5 tablets by mouth 2 (Two) Times a Day., Disp: 90 tablet, Rfl: 3    diclofenac (VOLTAREN) 75 MG EC tablet, Take 1 tablet by mouth 2 (Two) Times a Day As Needed (pain)., Disp: 60 tablet, Rfl: 1    Diclofenac Sodium (VOLTAREN) 1 % gel gel, Apply 4 g topically to the appropriate area as directed 4 (Four) Times a Day As Needed (neck pain)., Disp: 100 g, Rfl: 1    ipratropium (ATROVENT) 0.06 % nasal spray, Administer 1 spray into the nostril(s) as directed by provider 2 (Two) Times a Day., Disp: 15 mL, Rfl: 0    losartan (Cozaar) 25 MG tablet, Take 1 tablet by mouth Daily., Disp: 90 tablet, Rfl: 1    metoprolol succinate XL (Toprol XL) 50 MG 24 hr tablet, Take 1 tablet by mouth Daily., Disp: 90 tablet, Rfl: 0    Refresh Tears 0.5 % solution, Administer 1 drop to both eyes 2 (Two) Times a Day As Needed for Dry Eyes., Disp: 90 mL, Rfl: 3    SUMAtriptan (IMITREX) 100 MG tablet, Take 1 tablet by mouth 1 (One) Time As Needed for Migraine., Disp: 30 tablet, Rfl: 3    tiZANidine (ZANAFLEX) 4 MG tablet, Take 1 tablet by mouth Every 8 (Eight) Hours As Needed for Muscle Spasms., Disp: 90 tablet, Rfl: 1    vitamin D (ERGOCALCIFEROL) 1.25 MG (06678  UT) capsule capsule, Take 1 capsule by mouth Every 7 (Seven) Days., Disp: 13 capsule, Rfl: 3

## 2025-01-09 ENCOUNTER — OFFICE VISIT (OUTPATIENT)
Dept: FAMILY MEDICINE CLINIC | Facility: CLINIC | Age: 55
End: 2025-01-09
Payer: MEDICARE

## 2025-01-09 VITALS
TEMPERATURE: 98.2 F | SYSTOLIC BLOOD PRESSURE: 132 MMHG | BODY MASS INDEX: 46.48 KG/M2 | DIASTOLIC BLOOD PRESSURE: 70 MMHG | HEART RATE: 112 BPM | OXYGEN SATURATION: 97 % | WEIGHT: 306.7 LBS | HEIGHT: 68 IN

## 2025-01-09 DIAGNOSIS — F41.9 ANXIETY: ICD-10-CM

## 2025-01-09 DIAGNOSIS — E78.00 PURE HYPERCHOLESTEROLEMIA: ICD-10-CM

## 2025-01-09 DIAGNOSIS — E66.01 CLASS 3 SEVERE OBESITY WITH BODY MASS INDEX (BMI) OF 45.0 TO 49.9 IN ADULT, UNSPECIFIED OBESITY TYPE, UNSPECIFIED WHETHER SERIOUS COMORBIDITY PRESENT: ICD-10-CM

## 2025-01-09 DIAGNOSIS — E55.9 VITAMIN D DEFICIENCY: ICD-10-CM

## 2025-01-09 DIAGNOSIS — I10 PRIMARY HYPERTENSION: ICD-10-CM

## 2025-01-09 DIAGNOSIS — Z87.891 FORMER SMOKER: ICD-10-CM

## 2025-01-09 DIAGNOSIS — Z12.11 SCREENING FOR COLON CANCER: ICD-10-CM

## 2025-01-09 DIAGNOSIS — R73.03 PREDIABETES: ICD-10-CM

## 2025-01-09 DIAGNOSIS — E66.813 CLASS 3 SEVERE OBESITY WITH BODY MASS INDEX (BMI) OF 45.0 TO 49.9 IN ADULT, UNSPECIFIED OBESITY TYPE, UNSPECIFIED WHETHER SERIOUS COMORBIDITY PRESENT: ICD-10-CM

## 2025-01-09 DIAGNOSIS — G43.809 OTHER MIGRAINE WITHOUT STATUS MIGRAINOSUS, NOT INTRACTABLE: Primary | ICD-10-CM

## 2025-01-09 PROCEDURE — 99214 OFFICE O/P EST MOD 30 MIN: CPT | Performed by: FAMILY MEDICINE

## 2025-01-09 PROCEDURE — 1125F AMNT PAIN NOTED PAIN PRSNT: CPT | Performed by: FAMILY MEDICINE

## 2025-01-09 PROCEDURE — 3075F SYST BP GE 130 - 139MM HG: CPT | Performed by: FAMILY MEDICINE

## 2025-01-09 PROCEDURE — 3078F DIAST BP <80 MM HG: CPT | Performed by: FAMILY MEDICINE

## 2025-01-09 PROCEDURE — G2211 COMPLEX E/M VISIT ADD ON: HCPCS | Performed by: FAMILY MEDICINE

## 2025-01-09 RX ORDER — LOSARTAN POTASSIUM 25 MG/1
25 TABLET ORAL DAILY
Qty: 90 TABLET | Refills: 3 | Status: SHIPPED | OUTPATIENT
Start: 2025-01-09

## 2025-01-09 RX ORDER — BUPROPION HYDROCHLORIDE 150 MG/1
150 TABLET ORAL DAILY
Qty: 60 TABLET | Refills: 1 | Status: SHIPPED | OUTPATIENT
Start: 2025-01-09

## 2025-01-09 RX ORDER — NALTREXONE HYDROCHLORIDE 50 MG/1
50 TABLET, FILM COATED ORAL DAILY
Qty: 60 TABLET | Refills: 1 | Status: SHIPPED | OUTPATIENT
Start: 2025-01-09

## 2025-01-09 NOTE — PROGRESS NOTES
Chief Complaint  Hypertension (Follow up /No concerns/)    Subjective          Иван Kearney presents to [facility]    Hypertension         History of Present Illness  The patient is a 55-year-old male who presents today for a 3-month follow-up appointment.    He was previously prescribed losartan 25 mg daily by Dr. Holbrook on 12/22/2024 due to persistent headaches, which he attributed to elevated blood pressure. His blood pressure at that time was recorded as 153/100. He has been compliant with the medication regimen, which also includes metoprolol 50 mg, and reports a significant improvement in his symptoms. He has been monitoring his blood pressure at home [facility].    He has expressed interest in weight loss medications and is scheduled to consult with a nutritionist. He has previously tried Topamax for migraine management but did not experience any weight loss as a side effect. He is seeking a medication that can aid in weight loss and curb his cravings.    He has a colonoscopy scheduled for 02/04/2025. His last colonoscopy was in 2019, during which polyps were identified and removed. He has not yet undergone the low-dose chest CT scan that was recommended in 03/2024 due to his smoking history.    He is currently on BuSpar for anxiety management, which he reports as being effective.    SOCIAL HISTORY  The patient has a smoking history.    FAMILY HISTORY  The patient reports no family history of medullary thyroid cancer or multiple endocrine neoplasia syndrome type II.    MEDICATIONS  Current: Losartan, metoprolol, diclofenac, tizanidine, BuSpar  Past: Topamax         Current Outpatient Medications   Medication Instructions    brimonidine (ALPHAGAN) 0.15 % ophthalmic solution 1 drop, Daily PRN    buPROPion XL (WELLBUTRIN XL) 150 mg, Oral, Daily    busPIRone (BUSPAR) 5 mg, Oral, 2 Times Daily    diclofenac (VOLTAREN) 75 mg, Oral, 2 Times Daily PRN     "Diclofenac Sodium (VOLTAREN) 4 g, Topical, 4 Times Daily PRN    ipratropium (ATROVENT) 0.06 % nasal spray 1 spray, Nasal, 2 Times Daily    losartan (COZAAR) 25 mg, Oral, Daily    metoprolol succinate XL (TOPROL XL) 50 mg, Oral, Daily    naltrexone (DEPADE) 50 mg, Oral, Daily    PEG 3350-KCl-NaCl-NaSulf-MgSul (Suflave) 178.7 g reconstituted solution 1 Box, Oral, Take As Directed, TAKE PER OFFICE INSTRUCTIONS    Refresh Tears 0.5 % solution 1 drop, Both Eyes, 2 Times Daily PRN    SUMAtriptan (IMITREX) 100 mg, Oral, Once As Needed    tiZANidine (ZANAFLEX) 4 mg, Oral, Every 8 Hours PRN    vitamin D (ERGOCALCIFEROL) 50,000 Units, Oral, Every 7 Days       The following portions of the patient's history were reviewed and updated as appropriate: allergies, current medications, past family history, past medical history, past social history, past surgical history, and problem list.    Objective   Vital Signs:   /70 (BP Location: Left arm, Patient Position: Sitting, Cuff Size: Adult)   Pulse 112   Temp 98.2 °F (36.8 °C) (Oral)   Ht 172.7 cm (68\")   Wt (!) 139 kg (306 lb 11.2 oz)   SpO2 97%   BMI 46.63 kg/m²     BP Readings from Last 3 Encounters:   01/09/25 132/70   11/22/24 160/100   09/19/24 130/86     Wt Readings from Last 3 Encounters:   01/09/25 (!) 139 kg (306 lb 11.2 oz)   11/22/24 133 kg (293 lb 14.4 oz)   09/19/24 135 kg (298 lb 6.4 oz)           Physical Exam  Vitals reviewed.   Constitutional:       Appearance: Normal appearance.   HENT:      Head: Normocephalic and atraumatic.      Right Ear: External ear normal.      Left Ear: External ear normal.   Eyes:      Conjunctiva/sclera: Conjunctivae normal.   Cardiovascular:      Rate and Rhythm: Normal rate and regular rhythm.      Heart sounds: No murmur heard.     No friction rub. No gallop.   Pulmonary:      Effort: Pulmonary effort is normal.      Breath sounds: Normal breath sounds. No wheezing or rhonchi.   Abdominal:      General: Bowel sounds are " normal. There is no distension.      Palpations: Abdomen is soft.      Tenderness: There is no abdominal tenderness.   Skin:     General: Skin is warm and dry.   Neurological:      Mental Status: He is alert and oriented to person, place, and time.      Cranial Nerves: No cranial nerve deficit.   Psychiatric:         Mood and Affect: Mood and affect normal.         Behavior: Behavior normal.         Thought Content: Thought content normal.         Judgment: Judgment normal.            Result Review :   The following data was reviewed by: David Markham DO on 01/09/2025:  Common labs          9/19/2024    11:03   Common Labs   Glucose 88    BUN 18    Creatinine 1.29    Sodium 139    Potassium 3.9    Chloride 105    Calcium 9.3    Albumin 4.3    Total Bilirubin 0.4    Alkaline Phosphatase 64    AST (SGOT) 25    ALT (SGPT) 29    WBC 6.08    Hemoglobin 15.2    Hematocrit 44.1    Platelets 309    Total Cholesterol 197    Triglycerides 77    HDL Cholesterol 50    LDL Cholesterol  133    Hemoglobin A1C 5.70    PSA 0.814             Lab Results   Component Value Date    COVID19 Detected (C) 12/21/2021    BILIRUBINUR Negative 12/04/2023       Results  Laboratory Studies  A1c is 5.7%. LDL was 133. PSA is normal. Vitamin D level looked normal.    Procedures        Assessment and Plan    Diagnoses and all orders for this visit:    1. Other migraine without status migrainosus, not intractable (Primary)    2. Primary hypertension  -     losartan (Cozaar) 25 MG tablet; Take 1 tablet by mouth Daily.  Dispense: 90 tablet; Refill: 3    3. Anxiety    4. Pure hypercholesterolemia    5. Prediabetes    6. Vitamin D deficiency    7. Screening for colon cancer  Overview:  2016- normal per patient. Repeat in 2026      8. Former smoker    9. Class 3 severe obesity with body mass index (BMI) of 45.0 to 49.9 in adult, unspecified obesity type, unspecified whether serious comorbidity present    Other orders  -     Diclofenac Sodium  (VOLTAREN) 1 % gel gel; Apply 4 g topically to the appropriate area as directed 4 (Four) Times a Day As Needed (neck pain).  Dispense: 100 g; Refill: 1  -     tiZANidine (ZANAFLEX) 4 MG tablet; Take 1 tablet by mouth Every 8 (Eight) Hours As Needed for Muscle Spasms.  Dispense: 90 tablet; Refill: 1  -     naltrexone (DEPADE) 50 MG tablet; Take 1 tablet by mouth Daily.  Dispense: 60 tablet; Refill: 1  -     buPROPion XL (Wellbutrin XL) 150 MG 24 hr tablet; Take 1 tablet by mouth Daily.  Dispense: 60 tablet; Refill: 1        Assessment & Plan  1. Hypertension.  His blood pressure has shown improvement with the current medication regimen of losartan 25 mg daily and metoprolol 50 mg daily. The target systolic blood pressure is set at 130 or below, with the most recent reading being 132. He is advised to continue home monitoring of his blood pressure. A prescription refill for losartan 25 mg daily will be provided.    2. Weight management.  His weight has increased from 270 pounds in 2023 to 306 pounds currently. Given his prediabetic status, an A1c of 5.7%, and an LDL level of 133, it is crucial to initiate weight loss measures. He is advised to reduce his intake of carbohydrates, sugars, and saturated fats. Due to his hypertension, phentermine is not a suitable option. Instead, Contrave, a combination of Wellbutrin and naltrexone, will be prescribed. This medication can help manage cravings and facilitate weight loss. If Contrave proves ineffective after a month, a switch to Wegovy will be considered. A 60-day supply of Wellbutrin 150 mg daily and naltrexone 50 mg daily will be provided.    3. Anxiety.  He reports that BuSpar is helping with his anxiety. He will continue with the current regimen.    4. Health maintenance.  He is scheduled for a colonoscopy on 2025. He is advised to call and schedule a low-dose chest CT scan before 2025, as the referral will  by then.    5. Medication  management.  Refills for diclofenac and tizanidine will be sent to the pharmacy.    Follow-up  The patient will follow up in 6 weeks.    PROCEDURE  Colonoscopy in 2019 identified and removed polyps.       Medications Discontinued During This Encounter   Medication Reason    Diclofenac Sodium (VOLTAREN) 1 % gel gel Reorder    tiZANidine (ZANAFLEX) 4 MG tablet Reorder    losartan (Cozaar) 25 MG tablet Reorder          Follow Up   Return in about 6 weeks (around 2/20/2025) for weight management.  Patient was given instructions and counseling regarding his condition or for health maintenance advice. Please see specific information pulled into the AVS if appropriate.     Patient or patient representative verbalized consent for the use of Ambient Listening during the visit with  David Markham DO for chart documentation. 1/9/2025  08:23 EST    David Markham DO  01/09/25  08:40 EST

## 2025-01-24 NOTE — PRE-PROCEDURE INSTRUCTIONS
Pat call complete. Spoke with patient arrival time at 0900, Entrance C of the [facility] hospital. Instructed to bring or have a  over the age of 18 set up to drive you home the day of procedure.      Instructed on clear liquid diet the day before, nothing red or purple. Call with any questions about the prep or if in need of the prep.    Ok to take morning bp meds with sip of water before 0700. Npo after 0700.    Hold all blood thinners and diabetic/weight loss medications prior    Reminded them not to eat or drink anything am of procedure unless its a sip of water with medications.      Patient verbalized understanding.

## 2025-02-03 ENCOUNTER — ANESTHESIA EVENT (OUTPATIENT)
Dept: GASTROENTEROLOGY | Facility: HOSPITAL | Age: 55
End: 2025-02-03
Payer: MEDICARE

## 2025-02-03 NOTE — ANESTHESIA PREPROCEDURE EVALUATION
" Anesthesia Evaluation     Patient summary reviewed and Nursing notes reviewed   NPO Solid Status: > 8 hours  NPO Liquid Status: > 4 hours           Airway   Mallampati: III  TM distance: >3 FB  Neck ROM: full  Possible difficult intubation and Large neck circumference  Dental - normal exam     Pulmonary - normal exam   (+) a smoker Current, smokeless tobacco,  Cardiovascular - normal exam    Patient on routine beta blocker    (+) hypertension 2 medications or greater      Neuro/Psych  (+) headaches (Migraines), psychiatric history Anxiety, Depression and PTSD  GI/Hepatic/Renal/Endo    (+) morbid obesity, renal disease- stones    Musculoskeletal     Abdominal   (+) obese   Substance History   (+) alcohol use (\"A few drinks per week\")     OB/GYN          Other   arthritis,     ROS/Med Hx Other: Smokeless tobacco last used at 4 AM    Pt reports waking up confused and sometimes aggressive from PTSD.       Phys Exam Other: Full beard                  Anesthesia Plan    ASA 3     general   total IV anesthesia  (Total IV Anesthesia    Patient understands anesthesia not responsible for dental damage.    Discussed risks with pt including aspiration, allergic reactions, apnea, advanced airway placement. Pt verbalized understanding. All questions answered.  )  intravenous induction     Anesthetic plan, risks, benefits, and alternatives have been provided, discussed and informed consent has been obtained with: patient.  Pre-procedure education provided  Plan discussed with CRNA.        CODE STATUS:         "

## 2025-02-04 ENCOUNTER — ANESTHESIA (OUTPATIENT)
Dept: GASTROENTEROLOGY | Facility: HOSPITAL | Age: 55
End: 2025-02-04
Payer: MEDICARE

## 2025-02-04 ENCOUNTER — HOSPITAL ENCOUNTER (OUTPATIENT)
Facility: HOSPITAL | Age: 55
Setting detail: HOSPITAL OUTPATIENT SURGERY
Discharge: HOME | End: 2025-02-04
Attending: INTERNAL MEDICINE
Payer: MEDICARE

## 2025-02-04 VITALS
OXYGEN SATURATION: 93 % | BODY MASS INDEX: 44.38 KG/M2 | RESPIRATION RATE: 16 BRPM | HEART RATE: 77 BPM | TEMPERATURE: 97 F | WEIGHT: 291.89 LBS | SYSTOLIC BLOOD PRESSURE: 144 MMHG | DIASTOLIC BLOOD PRESSURE: 100 MMHG

## 2025-02-04 PROCEDURE — 25010000002 FENTANYL CITRATE (PF) 50 MCG/ML SOLUTION: Performed by: NURSE ANESTHETIST, CERTIFIED REGISTERED

## 2025-02-04 PROCEDURE — 25010000002 PROPOFOL 10 MG/ML EMULSION: Performed by: NURSE ANESTHETIST, CERTIFIED REGISTERED

## 2025-02-04 PROCEDURE — 25810000003 LACTATED RINGERS PER 1000 ML

## 2025-02-04 PROCEDURE — 25010000002 LIDOCAINE PF 2% 2 % SOLUTION: Performed by: NURSE ANESTHETIST, CERTIFIED REGISTERED

## 2025-02-04 RX ORDER — FENTANYL CITRATE 50 UG/ML
INJECTION, SOLUTION INTRAMUSCULAR; INTRAVENOUS AS NEEDED
Status: DISCONTINUED | OUTPATIENT
Start: 2025-02-04 | End: 2025-02-04 | Stop reason: SURG

## 2025-02-04 RX ORDER — LIDOCAINE HYDROCHLORIDE 20 MG/ML
INJECTION, SOLUTION EPIDURAL; INFILTRATION; INTRACAUDAL; PERINEURAL AS NEEDED
Status: DISCONTINUED | OUTPATIENT
Start: 2025-02-04 | End: 2025-02-04 | Stop reason: SURG

## 2025-02-04 RX ORDER — SODIUM CHLORIDE, SODIUM LACTATE, POTASSIUM CHLORIDE, CALCIUM CHLORIDE 600; 310; 30; 20 MG/100ML; MG/100ML; MG/100ML; MG/100ML
30 INJECTION, SOLUTION INTRAVENOUS CONTINUOUS
Status: DISCONTINUED | OUTPATIENT
Start: 2025-02-04 | End: 2025-02-04 | Stop reason: HOSPADM

## 2025-02-04 RX ORDER — PROPOFOL 10 MG/ML
VIAL (ML) INTRAVENOUS AS NEEDED
Status: DISCONTINUED | OUTPATIENT
Start: 2025-02-04 | End: 2025-02-04 | Stop reason: SURG

## 2025-02-04 RX ADMIN — LIDOCAINE HYDROCHLORIDE 50 MG: 20 INJECTION, SOLUTION EPIDURAL; INFILTRATION; INTRACAUDAL; PERINEURAL at 10:23

## 2025-02-04 RX ADMIN — PROPOFOL 150 MG: 10 INJECTION, EMULSION INTRAVENOUS at 10:23

## 2025-02-04 RX ADMIN — FENTANYL CITRATE 50 MCG: 50 INJECTION, SOLUTION INTRAMUSCULAR; INTRAVENOUS at 10:28

## 2025-02-04 RX ADMIN — PROPOFOL 50 MG: 10 INJECTION, EMULSION INTRAVENOUS at 10:25

## 2025-02-04 RX ADMIN — SODIUM CHLORIDE, POTASSIUM CHLORIDE, SODIUM LACTATE AND CALCIUM CHLORIDE 30 ML/HR: 600; 310; 30; 20 INJECTION, SOLUTION INTRAVENOUS at 09:07

## 2025-02-04 RX ADMIN — FENTANYL CITRATE 50 MCG: 50 INJECTION, SOLUTION INTRAMUSCULAR; INTRAVENOUS at 10:32

## 2025-02-04 RX ADMIN — PROPOFOL 175 MCG/KG/MIN: 10 INJECTION, EMULSION INTRAVENOUS at 10:23

## 2025-02-04 NOTE — H&P
ScreeningPre Procedure History & Physical    Chief Complaint:   Screening     Subjective     HPI:   Screening     Past Medical History:   Past Medical History:   Diagnosis Date    Allergies     Anxiety     Arthritis     Broken bones     Deafness     Depression     Foot pain, bilateral     Head injury 2007    NO RESIDUAL    Heel pain, bilateral     Hemorrhoids     Kidney stones 2013    Migraine     Night sweats     Psychiatric illness 2015    PTSD (post-traumatic stress disorder)     Sinus trouble     Skin disease        Past Surgical History:  Past Surgical History:   Procedure Laterality Date    BACK SURGERY      BRAIN SURGERY  2015    SKULL FX    COLONOSCOPY  05/20/2019    3 POL[facility]PS-[facility] IN 2019-REPEAT IN 5 YEARS    HEMORRHOIDECTOMY N/A 4/3/2024    Procedure: HEMORRHOIDECTOMY;  Surgeon: Kirill Quinn MD;  [place] [facility] [place];  Service: General;  Laterality: N/A;    LEG SURGERY Right 1999    BROKEN    SPINE SURGERY  2015    FUSED    URETEROSCOPY      STONE REMOVAL    WRIST SURGERY Right 2009    BROKEN       Family History:  Family History   Problem Relation Age of Onset    Breast cancer Mother 27    No Known Problems Father     Colon cancer Neg Hx     Esophageal cancer Neg Hx        Social History:   reports that he quit smoking about 10 years ago. His smoking use included cigarettes. He started smoking about 43 years ago. He has a 33 pack-year smoking history. He has been exposed to tobacco smoke. His smokeless tobacco use includes snuff and chew. He reports current alcohol use. He reports that he does not use drugs.    Medications:   Medications Prior to Admission   Medication Sig Dispense Refill Last Dose/Taking    brimonidine (ALPHAGAN) 0.15 % ophthalmic solution Administer 1 drop to both eyes Daily As Needed.   2/3/2025    buPROPion XL (Wellbutrin XL) 150 MG 24 hr tablet Take 1 tablet by mouth Daily. 60 tablet 1 2/3/2025    busPIRone (BUSPAR) 10 MG tablet Take 0.5 tablets by mouth 2 (Two) Times a  Day. 90 tablet 3 2/3/2025    diclofenac (VOLTAREN) 75 MG EC tablet Take 1 tablet by mouth 2 (Two) Times a Day As Needed (pain). 60 tablet 1 2/3/2025    Diclofenac Sodium (VOLTAREN) 1 % gel gel Apply 4 g topically to the appropriate area as directed 4 (Four) Times a Day As Needed (neck pain). 100 g 1 2/3/2025    ipratropium (ATROVENT) 0.06 % nasal spray Administer 1 spray into the nostril(s) as directed by provider 2 (Two) Times a Day. 15 mL 0 2/3/2025    losartan (Cozaar) 25 MG tablet Take 1 tablet by mouth Daily. 90 tablet 3 2/3/2025    metoprolol succinate XL (Toprol XL) 50 MG 24 hr tablet Take 1 tablet by mouth Daily. 90 tablet 0 2/3/2025    naltrexone (DEPADE) 50 MG tablet Take 1 tablet by mouth Daily. 60 tablet 1 2/3/2025    SUMAtriptan (IMITREX) 100 MG tablet Take 1 tablet by mouth 1 (One) Time As Needed for Migraine. 30 tablet 3 2/3/2025    tiZANidine (ZANAFLEX) 4 MG tablet Take 1 tablet by mouth Every 8 (Eight) Hours As Needed for Muscle Spasms. 90 tablet 1 2/3/2025    vitamin D (ERGOCALCIFEROL) 1.25 MG (83725 UT) capsule capsule Take 1 capsule by mouth Every 7 (Seven) Days. 13 capsule 3 2/3/2025    Refresh Tears 0.5 % solution Administer 1 drop to both eyes 2 (Two) Times a Day As Needed for Dry Eyes. 90 mL 3        Allergies:  Patient has no known allergies.        Objective     Blood pressure 165/99, pulse 61, temperature 98.1 °F (36.7 °C), temperature source Temporal, resp. rate 19, weight 132 kg (291 lb 14.2 oz), SpO2 94%.    Physical Exam   Constitutional: Pt is oriented to person, place, and time and well-developed, well-nourished, and in no distress.   Mouth/Throat: Oropharynx is clear and moist.   Neck: Normal range of motion.   Cardiovascular: Normal rate, regular rhythm and normal heart sounds.    Pulmonary/Chest: Effort normal and breath sounds normal.   Abdominal: Soft. Nontender  Skin: Skin is warm and dry.   Psychiatric: Mood, memory, affect and judgment normal.     Assessment & Plan      Diagnosis:  Screening colonoscopy       Anticipated Surgical Procedure:  colonoscopy    The risks, benefits, and alternatives of this procedure have been discussed with the patient or the responsible party- the patient understands and agrees to proceed.

## 2025-02-04 NOTE — ANESTHESIA POSTPROCEDURE EVALUATION
Patient: Иван Kearney    Procedure Summary       Date: 02/04/25 Room / Location: [facility] ENDOSCOPY 2 / [facility]    Anesthesia Start: 1019 Anesthesia Stop: 1045    Procedure: COLONOSCOPY Diagnosis:       Encounter for screening colonoscopy      Hx of colonic polyps      (Encounter for screening colonoscopy [Z12.11])      (Hx of colonic polyps [Z86.0100])    Surgeons: Kai Rodrigues MD Provider: Luis Lynn CRNA    Anesthesia Type: general ASA Status: 3            Anesthesia Type: general    Vitals  Vitals Value Taken Time   /100 02/04/25 1058   Temp 36.1 °C (97 °F) 02/04/25 1057   Pulse 68 02/04/25 1058   Resp 16 02/04/25 1057   SpO2 93 % 02/04/25 1058   Vitals shown include unfiled device data.        Post Anesthesia Care and Evaluation    Patient location during evaluation: bedside  Patient participation: complete - patient participated  Level of consciousness: awake  Pain management: adequate    Airway patency: patent  Anesthetic complications: No anesthetic complications  PONV Status: controlled  Cardiovascular status: acceptable and stable  Respiratory status: acceptable

## 2025-02-11 ENCOUNTER — TELEPHONE (OUTPATIENT)
Dept: FAMILY MEDICINE CLINIC | Facility: CLINIC | Age: 55
End: 2025-02-11
Payer: MEDICARE

## 2025-02-11 RX ORDER — SEMAGLUTIDE 0.25 MG/.5ML
0.25 INJECTION, SOLUTION SUBCUTANEOUS WEEKLY
Qty: 2 ML | Refills: 0 | Status: SHIPPED | OUTPATIENT
Start: 2025-02-11 | End: 2025-02-12 | Stop reason: SDUPTHER

## 2025-02-11 NOTE — TELEPHONE ENCOUNTER
Caller: BRYCE REED    Relationship to patient: Emergency Contact    Best call back number: [phone]    Patient is needing: PATIENT'S WIFE CALLED IN AND SAID NALTREXONE IS NOT DOING ANYTHING FOR PATIENT AND HE HAS BEEN ON IT FOR THREE WEEKS. PATIENT WOULD LIKE A CALL TO SEE IF HE COULD START A WEIGHT LOSS MEDICATION SHOT DISCUSSED AT LAST VISIT.

## 2025-02-11 NOTE — TELEPHONE ENCOUNTER
I will go ahead and send in Wegovy to take once weekly for a month.  Please have him call after the first month as the dose will need to increase.  We start off on a low-dose to see how he tolerates it first.  Please have him keep follow-up appointment on 2/27/2025.  Please have him take the Wellbutrin/bupropion every other day for a week and then he may discontinue.  He may stop the naltrexone.

## 2025-02-11 NOTE — TELEPHONE ENCOUNTER
Phone call placed to patient with instructions of medications per PCP times 2 with patient and 2 with spouse with voiced understanding.

## 2025-02-12 ENCOUNTER — TELEPHONE (OUTPATIENT)
Dept: FAMILY MEDICINE CLINIC | Facility: CLINIC | Age: 55
End: 2025-02-12
Payer: MEDICARE

## 2025-02-12 DIAGNOSIS — E66.813 CLASS 3 SEVERE OBESITY WITH BODY MASS INDEX (BMI) OF 45.0 TO 49.9 IN ADULT, UNSPECIFIED OBESITY TYPE, UNSPECIFIED WHETHER SERIOUS COMORBIDITY PRESENT: Primary | ICD-10-CM

## 2025-02-12 DIAGNOSIS — E66.01 CLASS 3 SEVERE OBESITY WITH BODY MASS INDEX (BMI) OF 45.0 TO 49.9 IN ADULT, UNSPECIFIED OBESITY TYPE, UNSPECIFIED WHETHER SERIOUS COMORBIDITY PRESENT: Primary | ICD-10-CM

## 2025-02-12 NOTE — TELEPHONE ENCOUNTER
Caller: BRYCE REED    Relationship: Emergency Contact    Best call back number: [phone] OK TO LEAVE VOICEMAIL MESSAGE    Requested Prescriptions:   Requested Prescriptions     Pending Prescriptions Disp Refills    Semaglutide-Weight Management (Wegovy) 0.25 MG/0.5ML solution auto-injector 2 mL 0     Sig: Inject 0.5 mL under the skin into the appropriate area as directed 1 (One) Time Per Week.        Pharmacy where request should be sent: [facility] [phone] [place], [place] [phone]      Last office visit with prescribing clinician: 1/9/2025   Last telemedicine visit with prescribing clinician: Visit date not found   Next office visit with prescribing clinician: 2/27/2025     Additional details provided by patient: PATIENT IS BEING PRESCRIBED THIS MEDICATION FOR THE FIRST TIME. HE NEEDS A PRIOR AUTHORIZATION SENT TO [facility] BEFORE THIS CAN BE FILLED. PLEASE CALL BRYCE WHEN THIS PA IS SENT TO [facility].    Does the patient have less than a 3 day supply:  [x] Yes  [] No    Would you like a call back once the refill request has been completed: [] Yes [] No    If the office needs to give you a call back, can they leave a voicemail: [] Yes [] No    Lidia Arevalo Rep   02/12/25 10:25 EST

## 2025-02-13 NOTE — TELEPHONE ENCOUNTER
Caller: BRYCE REED    Relationship: Emergency Contact    Best call back number: [phone]    What was the call regarding: BRYCE STATES SHE WOULD LIKE A CALL TO LET HER KNOW WHEN THE PRIOR AUTHORIZATION WAS SENT.

## 2025-02-13 NOTE — TELEPHONE ENCOUNTER
Phone call placed to patient and spouse with update on medication taken and requirements for an prior authorization.  PA to be sent tomorrow by Elissa with a followup phone call back to the patient.

## 2025-02-14 RX ORDER — SEMAGLUTIDE 0.25 MG/.5ML
0.25 INJECTION, SOLUTION SUBCUTANEOUS WEEKLY
Qty: 2 ML | Refills: 0 | Status: SHIPPED | OUTPATIENT
Start: 2025-02-14

## 2025-02-14 NOTE — TELEPHONE ENCOUNTER
Message from plan: CaseId:[id number];Status:Approved;Review Type:Prior Auth;Coverage Start Date:01/15/2025;Coverage End Date:02/14/2026;. Authorization Expiration Date: February 14, 2026.

## 2025-02-27 ENCOUNTER — OFFICE VISIT (OUTPATIENT)
Dept: FAMILY MEDICINE CLINIC | Facility: CLINIC | Age: 55
End: 2025-02-27
Payer: MEDICARE

## 2025-02-27 VITALS
OXYGEN SATURATION: 96 % | SYSTOLIC BLOOD PRESSURE: 132 MMHG | HEIGHT: 68 IN | WEIGHT: 296 LBS | HEART RATE: 106 BPM | TEMPERATURE: 98.2 F | BODY MASS INDEX: 44.86 KG/M2 | DIASTOLIC BLOOD PRESSURE: 88 MMHG

## 2025-02-27 DIAGNOSIS — I10 PRIMARY HYPERTENSION: ICD-10-CM

## 2025-02-27 DIAGNOSIS — E66.813 CLASS 3 SEVERE OBESITY WITH BODY MASS INDEX (BMI) OF 45.0 TO 49.9 IN ADULT, UNSPECIFIED OBESITY TYPE, UNSPECIFIED WHETHER SERIOUS COMORBIDITY PRESENT: Primary | ICD-10-CM

## 2025-02-27 DIAGNOSIS — F41.9 ANXIETY: ICD-10-CM

## 2025-02-27 DIAGNOSIS — H04.123 DRY EYES: ICD-10-CM

## 2025-02-27 DIAGNOSIS — G43.809 OTHER MIGRAINE WITHOUT STATUS MIGRAINOSUS, NOT INTRACTABLE: ICD-10-CM

## 2025-02-27 DIAGNOSIS — Z12.11 SCREENING FOR COLON CANCER: ICD-10-CM

## 2025-02-27 DIAGNOSIS — E66.01 CLASS 3 SEVERE OBESITY WITH BODY MASS INDEX (BMI) OF 45.0 TO 49.9 IN ADULT, UNSPECIFIED OBESITY TYPE, UNSPECIFIED WHETHER SERIOUS COMORBIDITY PRESENT: Primary | ICD-10-CM

## 2025-02-27 DIAGNOSIS — T14.8XXA PULLED MUSCLE: ICD-10-CM

## 2025-02-27 DIAGNOSIS — K57.90 DIVERTICULOSIS: ICD-10-CM

## 2025-02-27 RX ORDER — CARBOXYMETHYLCELLULOSE SODIUM 5 MG/ML
1 SOLUTION/ DROPS OPHTHALMIC 2 TIMES DAILY PRN
Qty: 90 ML | Refills: 3 | Status: SHIPPED | OUTPATIENT
Start: 2025-02-27

## 2025-02-27 RX ORDER — SEMAGLUTIDE 0.5 MG/.5ML
0.5 INJECTION, SOLUTION SUBCUTANEOUS WEEKLY
Qty: 2 ML | Refills: 0 | Status: SHIPPED | OUTPATIENT
Start: 2025-02-27

## 2025-02-27 NOTE — PROGRESS NOTES
Chief Complaint  Weight management    Subjective          Иван Kearney presents to [facility]    History of Present Illness     History of Present Illness  The patient is a 55-year-old male who presents today for a 6-week follow-up.    He has been prescribed Wegovy, with the prior authorization successfully obtained. He has 2 injections remaining and has experienced a slight weight loss. He is prepared to escalate to the next dosage level. His primary challenge is managing his eating habits, which the medication has been beneficial in controlling. He anticipates requiring a new dose on 03/17/2025. He was previously on a regimen of 2 other medications, which have since been discontinued as per the physician's instructions.    He underwent a colonoscopy, which he reports was not as unpleasant as his initial experience. He has been informed of an alternative to the liquid preparation for the procedure, which involves taking pills, and plans to discuss this with his gastroenterologist.    His blood pressure has shown improvement. He is currently on a daily regimen of losartan 25 mg and metoprolol 50 mg.    He continues to take BuSpar for anxiety management.    He has been experiencing severe pain for the past 2 days, which he describes as similar to a gas bubble or the need to defecate. The pain is localized to a specific area. He reports no changes in bowel movements or presence of blood in stool. He applied a heating pad to the area, which alleviated the pain after approximately 4 to 5 hours. However, the pain recurred the following day, prompting him to use the heating pad again. He can feel the pain deep inside because of his obesity. He is uncertain if the pain is due to a pulled muscle. He reports no fever. He has been consuming a significant amount of liquids and is unable to determine if he has diarrhea.    He also reports a minor twist in his right ankle, but did not  "fall. He is able to move the ankle without difficulty.    His migraines are well-managed. He continues to take metoprolol for blood pressure and migraine management and has Imitrex available for any potential flare-ups.    MEDICATIONS  Current: Wegovy, losartan, metoprolol, BuSpar, Imitrex  Discontinued: Wellbutrin, naltrexone         Current Outpatient Medications   Medication Instructions    brimonidine (ALPHAGAN) 0.15 % ophthalmic solution 1 drop, Daily PRN    busPIRone (BUSPAR) 5 mg, Oral, 2 Times Daily    diclofenac (VOLTAREN) 75 mg, Oral, 2 Times Daily PRN    Diclofenac Sodium (VOLTAREN) 4 g, Topical, 4 Times Daily PRN    ipratropium (ATROVENT) 0.06 % nasal spray 1 spray, Nasal, 2 Times Daily    losartan (COZAAR) 25 mg, Oral, Daily    metoprolol succinate XL (TOPROL XL) 50 mg, Oral, Daily    Refresh Tears 0.5 % solution 1 drop, Both Eyes, 2 Times Daily PRN    SUMAtriptan (IMITREX) 100 mg, Oral, Once As Needed    tiZANidine (ZANAFLEX) 4 mg, Oral, Every 8 Hours PRN    vitamin D (ERGOCALCIFEROL) 50,000 Units, Oral, Every 7 Days    Wegovy 0.25 mg, Subcutaneous, Weekly    Wegovy 0.5 mg, Subcutaneous, Weekly       The following portions of the patient's history were reviewed and updated as appropriate: allergies, current medications, past family history, past medical history, past social history, past surgical history, and problem list.    Objective   Vital Signs:   /88 (BP Location: Left arm, Patient Position: Sitting)   Pulse 106   Temp 98.2 °F (36.8 °C)   Ht 172.7 cm (67.99\")   Wt 134 kg (296 lb)   SpO2 96%   BMI 45.02 kg/m²     BP Readings from Last 3 Encounters:   02/27/25 132/88   02/04/25 144/100   01/09/25 132/70     Wt Readings from Last 3 Encounters:   02/27/25 134 kg (296 lb)   02/04/25 132 kg (291 lb 14.2 oz)   01/09/25 (!) 139 kg (306 lb 11.2 oz)           Physical Exam  Vitals reviewed.   Constitutional:       Appearance: Normal appearance.   HENT:      Head: Normocephalic and atraumatic. "      Right Ear: External ear normal.      Left Ear: External ear normal.   Eyes:      Conjunctiva/sclera: Conjunctivae normal.   Cardiovascular:      Rate and Rhythm: Normal rate and regular rhythm.      Heart sounds: No murmur heard.     No friction rub. No gallop.   Pulmonary:      Effort: Pulmonary effort is normal.      Breath sounds: Normal breath sounds. No wheezing or rhonchi.   Abdominal:      General: Bowel sounds are normal. There is no distension.      Palpations: Abdomen is soft.      Comments: Reproducible tenderness to palpation on the left side of the abdomen. Worse with sight flexion of the abdomen.   Musculoskeletal:      Comments: Good ROM of the right ankle. No pain.    Skin:     General: Skin is warm and dry.   Neurological:      Mental Status: He is alert and oriented to person, place, and time.      Cranial Nerves: No cranial nerve deficit.   Psychiatric:         Mood and Affect: Mood and affect normal.         Behavior: Behavior normal.         Thought Content: Thought content normal.         Judgment: Judgment normal.            Result Review :   The following data was reviewed by: David Markham DO on 02/27/2025:  Common labs          9/19/2024    11:03   Common Labs   Glucose 88    BUN 18    Creatinine 1.29    Sodium 139    Potassium 3.9    Chloride 105    Calcium 9.3    Albumin 4.3    Total Bilirubin 0.4    Alkaline Phosphatase 64    AST (SGOT) 25    ALT (SGPT) 29    WBC 6.08    Hemoglobin 15.2    Hematocrit 44.1    Platelets 309    Total Cholesterol 197    Triglycerides 77    HDL Cholesterol 50    LDL Cholesterol  133    Hemoglobin A1C 5.70    PSA 0.814             Lab Results   Component Value Date    COVID19 Detected (C) 12/21/2021    BILIRUBINUR Negative 12/04/2023       Results  Imaging  Colonoscopy showed diverticulosis of the sigmoid colon.    Procedures        Assessment and Plan    Diagnoses and all orders for this visit:    1. Class 3 severe obesity with body mass index (BMI)  of 45.0 to 49.9 in adult, unspecified obesity type, unspecified whether serious comorbidity present (Primary)    2. Dry eyes    3. Screening for colon cancer  Overview:  2016- normal per patient. Repeat in 2026      4. Diverticulosis    5. Primary hypertension    6. Anxiety    7. Pulled muscle    8. Other migraine without status migrainosus, not intractable    Other orders  -     Refresh Tears 0.5 % solution; Administer 1 drop to both eyes 2 (Two) Times a Day As Needed for Dry Eyes.  Dispense: 90 mL; Refill: 3  -     Semaglutide-Weight Management (Wegovy) 0.5 MG/0.5ML solution auto-injector; Inject 0.5 mL under the skin into the appropriate area as directed 1 (One) Time Per Week.  Dispense: 2 mL; Refill: 0        Assessment & Plan  1. Weight management.  He has demonstrated a commendable weight loss of 10 pounds since the last visit, reducing from 306 to 296 pounds. He will continue with the current regimen of Wegovy 0.25 mg injections for an additional 2 weeks. Subsequently, the dosage will be increased to 0.5 mg for a duration of 1 month. Following this, he will inform us of his readiness to escalate the dose to 1 mg, which will be maintained for another month. The final dosage increase will be to 1.7 mg, and eventually to the maximum dose of 2.4 mg. The next dose of Wegovy will be sent to [facility]. If he experiences any side effects such as nausea, upset stomach, constipation, or diarrhea, the dosage will not be increased. If the 2.4 mg dose proves too high, he may revert to the 1.7 mg dose.    2. Diverticulosis.  He has been diagnosed with diverticulosis of the sigmoid colon, as confirmed by a recent colonoscopy. No samples were collected during the procedure. A repeat colonoscopy is scheduled for 5 years from now.    3. Hypertension.  His blood pressure readings have shown improvement, currently at 132/88. He is currently on losartan 25 mg daily and metoprolol 50 mg daily.    4. Anxiety.  He continues  to take BuSpar for anxiety management.    5. Suspected muscle strain.  He reports tenderness in a specific area, which is likely due to a pulled muscle. He does not have any symptoms suggestive of diverticulitis, such as bloody stools, changes in stool consistency, or fever. He will continue to apply heat to the affected area and avoid movement. If the condition worsens, he will inform us, and a CT scan may be considered.    6. Right ankle sprain.  He reports a minor twist in his right ankle but did not fall. He is able to move the ankle without difficulty.    7. Migraine.  He continues to take metoprolol for blood pressure and migraine management and has Imitrex available for any potential flare-ups.    PROCEDURE  The patient underwent a colonoscopy, which revealed diverticulosis of the sigmoid colon.       Medications Discontinued During This Encounter   Medication Reason    Refresh Tears 0.5 % solution Reorder          Follow Up   Return in about 3 months (around 5/27/2025) for hypertension.  Patient was given instructions and counseling regarding his condition or for health maintenance advice. Please see specific information pulled into the AVS if appropriate.     Patient or patient representative verbalized consent for the use of Ambient Listening during the visit with  David Markham DO for chart documentation. 2/27/2025  09:56 EST    David Markham DO  02/27/25  10:10 EST

## 2025-05-01 ENCOUNTER — TELEPHONE (OUTPATIENT)
Dept: FAMILY MEDICINE CLINIC | Facility: CLINIC | Age: 55
End: 2025-05-01
Payer: MEDICARE

## 2025-05-01 DIAGNOSIS — I10 PRIMARY HYPERTENSION: ICD-10-CM

## 2025-05-01 RX ORDER — METOPROLOL SUCCINATE 50 MG/1
50 TABLET, EXTENDED RELEASE ORAL DAILY
Qty: 90 TABLET | Refills: 0 | Status: SHIPPED | OUTPATIENT
Start: 2025-05-01

## 2025-05-01 RX ORDER — DICLOFENAC SODIUM 75 MG/1
75 TABLET, DELAYED RELEASE ORAL 2 TIMES DAILY PRN
Qty: 60 TABLET | Refills: 1 | Status: SHIPPED | OUTPATIENT
Start: 2025-05-01

## 2025-05-01 RX ORDER — LOSARTAN POTASSIUM 25 MG/1
25 TABLET ORAL DAILY
Qty: 90 TABLET | Refills: 3 | Status: SHIPPED | OUTPATIENT
Start: 2025-05-01

## 2025-05-01 NOTE — TELEPHONE ENCOUNTER
Caller: BRYCE REED    Relationship: Emergency Contact    Best call back number: [phone]    What medication are you requesting: WEGOVY NEXT LEVEL UP    What are your current symptoms: N/A    How long have you been experiencing symptoms: N/A    Have you had these symptoms before:    [] Yes  [] No    Have you been treated for these symptoms before:   [] Yes  [] No    If a prescription is needed, what is your preferred pharmacy and phone number: [facility] - [address] [phone] [facility] [phone]      Additional notes:PATIENT WILL TAKE HIS LAST SHOT ON MONDAY. HE NEEDS A NEW PRESCRIPTION SENT TO PHARMACY WITH NEXT DOSAGE UP. PLEASE SEND NEW PRESCRIPTION TO PHARMACY.

## 2025-05-01 NOTE — TELEPHONE ENCOUNTER
Caller: BRYCE REED    Relationship: Emergency Contact    Best call back number: [phone]    Requested Prescriptions:   Requested Prescriptions     Pending Prescriptions Disp Refills    diclofenac (VOLTAREN) 75 MG EC tablet 60 tablet 1     Sig: Take 1 tablet by mouth 2 (Two) Times a Day As Needed (pain).    losartan (Cozaar) 25 MG tablet 90 tablet 3     Sig: Take 1 tablet by mouth Daily.    metoprolol succinate XL (Toprol XL) 50 MG 24 hr tablet 90 tablet 0     Sig: Take 1 tablet by mouth Daily.    tiZANidine (ZANAFLEX) 4 MG tablet 90 tablet 1     Sig: Take 1 tablet by mouth Every 8 (Eight) Hours As Needed for Muscle Spasms.        Pharmacy where request should be sent: [place] [phone] [place] [phone]      Last office visit with prescribing clinician: 2/27/2025   Last telemedicine visit with prescribing clinician: Visit date not found   Next office visit with prescribing clinician: 5/29/2025     Additional details provided by patient: PATIENT NEEDS NEW PRESCRIPTIONS WITH REFILLS FOR THESE MEDICATIONS. PLEASE SEND NEW PRESCRIPTIONS TO PHARMACY.    Does the patient have less than a 3 day supply:  [] Yes  [x] No    Would you like a call back once the refill request has been completed: [] Yes [] No    If the office needs to give you a call back, can they leave a voicemail: [] Yes [] No    Lidia Arevalo Rep   05/01/25 13:42 EDT

## 2025-05-29 ENCOUNTER — OFFICE VISIT (OUTPATIENT)
Dept: FAMILY MEDICINE CLINIC | Facility: CLINIC | Age: 55
End: 2025-05-29
Payer: MEDICARE

## 2025-05-29 VITALS
HEIGHT: 68 IN | HEART RATE: 94 BPM | SYSTOLIC BLOOD PRESSURE: 124 MMHG | DIASTOLIC BLOOD PRESSURE: 88 MMHG | TEMPERATURE: 97.5 F | OXYGEN SATURATION: 96 % | BODY MASS INDEX: 42.1 KG/M2 | WEIGHT: 277.8 LBS

## 2025-05-29 DIAGNOSIS — E55.9 VITAMIN D DEFICIENCY: ICD-10-CM

## 2025-05-29 DIAGNOSIS — Z12.5 SCREENING FOR PROSTATE CANCER: ICD-10-CM

## 2025-05-29 DIAGNOSIS — G47.00 INSOMNIA, UNSPECIFIED TYPE: ICD-10-CM

## 2025-05-29 DIAGNOSIS — G43.809 OTHER MIGRAINE WITHOUT STATUS MIGRAINOSUS, NOT INTRACTABLE: ICD-10-CM

## 2025-05-29 DIAGNOSIS — E78.00 PURE HYPERCHOLESTEROLEMIA: ICD-10-CM

## 2025-05-29 DIAGNOSIS — Z87.891 FORMER SMOKER: ICD-10-CM

## 2025-05-29 DIAGNOSIS — Z51.81 MEDICATION MONITORING ENCOUNTER: ICD-10-CM

## 2025-05-29 DIAGNOSIS — R73.03 PREDIABETES: ICD-10-CM

## 2025-05-29 DIAGNOSIS — I10 PRIMARY HYPERTENSION: ICD-10-CM

## 2025-05-29 DIAGNOSIS — E66.813 CLASS 3 SEVERE OBESITY WITH BODY MASS INDEX (BMI) OF 45.0 TO 49.9 IN ADULT, UNSPECIFIED OBESITY TYPE, UNSPECIFIED WHETHER SERIOUS COMORBIDITY PRESENT: Primary | ICD-10-CM

## 2025-05-29 DIAGNOSIS — E66.01 CLASS 3 SEVERE OBESITY WITH BODY MASS INDEX (BMI) OF 45.0 TO 49.9 IN ADULT, UNSPECIFIED OBESITY TYPE, UNSPECIFIED WHETHER SERIOUS COMORBIDITY PRESENT: Primary | ICD-10-CM

## 2025-05-29 RX ORDER — SEMAGLUTIDE 2.4 MG/.75ML
2.4 INJECTION, SOLUTION SUBCUTANEOUS WEEKLY
Qty: 3 ML | Refills: 3 | Status: SHIPPED | OUTPATIENT
Start: 2025-05-29

## 2025-05-29 NOTE — PROGRESS NOTES
Chief Complaint  Weight Management    Subjective          Иван Kearney presents to [facility]    Weight Management       History of Present Illness  The patient is a 55-year-old male who presents for follow-up.    He has been managing his weight effectively, with a current weight of 277 pounds, down from 296 pounds. He attributes this success to the initiation of Wegovy treatment. However, he reports experiencing headaches, which he suspects may be a side effect of the medication. Despite this, he expresses a desire to continue with the treatment and requests a refill, as he only has one dose remaining. He also indicates a willingness to increase the dosage if necessary.    He reports chronic sleep disturbances but declines any pharmacological intervention, citing previous experiences of morning grogginess and a hangover-like sensation as reasons for his reluctance.    His migraines are generally well-managed with metoprolol, although he notes that they can be exacerbated by weather changes. He does not require any additional medication for sinus issues.    His blood pressure is well-controlled today with losartan 25 mg daily and metoprolol 50 mg daily, a significant improvement from previous readings of 208/140.    He has been abstinent from smoking for approximately 12 years. He does not take any thyroid medication.    SOCIAL HISTORY  The patient quit smoking around 12 years ago.         Current Outpatient Medications   Medication Instructions    brimonidine (ALPHAGAN) 0.15 % ophthalmic solution 1 drop, Daily PRN    busPIRone (BUSPAR) 5 mg, Oral, 2 Times Daily    diclofenac (VOLTAREN) 75 mg, Oral, 2 Times Daily PRN    Diclofenac Sodium (VOLTAREN) 4 g, Topical, 4 Times Daily PRN    ipratropium (ATROVENT) 0.06 % nasal spray 1 spray, Nasal, 2 Times Daily    losartan (COZAAR) 25 mg, Oral, Daily    metoprolol succinate XL (TOPROL XL) 50 mg, Oral, Daily    Refresh Tears 0.5 %  "solution 1 drop, Both Eyes, 2 Times Daily PRN    SUMAtriptan (IMITREX) 100 mg, Oral, Once As Needed    tiZANidine (ZANAFLEX) 4 mg, Oral, Every 8 Hours PRN    vitamin D (ERGOCALCIFEROL) 50,000 Units, Oral, Every 7 Days    Wegovy 2.4 mg, Subcutaneous, Weekly       The following portions of the patient's history were reviewed and updated as appropriate: allergies, current medications, past family history, past medical history, past social history, past surgical history, and problem list.    Objective   Vital Signs:   /88   Pulse 94   Temp 97.5 °F (36.4 °C) (Oral)   Ht 172.7 cm (68\")   Wt 126 kg (277 lb 12.8 oz)   SpO2 96%   BMI 42.24 kg/m²     BP Readings from Last 3 Encounters:   05/29/25 124/88   02/27/25 132/88   02/04/25 144/100     Wt Readings from Last 3 Encounters:   05/29/25 126 kg (277 lb 12.8 oz)   02/27/25 134 kg (296 lb)   02/04/25 132 kg (291 lb 14.2 oz)           Physical Exam  Vitals reviewed.   Constitutional:       Appearance: Normal appearance.   HENT:      Head: Normocephalic and atraumatic.      Right Ear: External ear normal.      Left Ear: External ear normal.   Eyes:      Conjunctiva/sclera: Conjunctivae normal.   Cardiovascular:      Rate and Rhythm: Normal rate and regular rhythm.      Heart sounds: No murmur heard.     No friction rub. No gallop.   Pulmonary:      Effort: Pulmonary effort is normal.      Breath sounds: Normal breath sounds. No wheezing or rhonchi.   Abdominal:      General: Bowel sounds are normal. There is no distension.      Palpations: Abdomen is soft.      Tenderness: There is no abdominal tenderness.   Skin:     General: Skin is warm and dry.   Neurological:      Mental Status: He is alert and oriented to person, place, and time.      Cranial Nerves: No cranial nerve deficit.   Psychiatric:         Mood and Affect: Mood and affect normal.         Behavior: Behavior normal.         Thought Content: Thought content normal.         Judgment: Judgment normal.      "       Result Review :   The following data was reviewed by: David Markham DO on 05/29/2025:  Common labs          9/19/2024    11:03   Common Labs   Glucose 88    BUN 18    Creatinine 1.29    Sodium 139    Potassium 3.9    Chloride 105    Calcium 9.3    Albumin 4.3    Total Bilirubin 0.4    Alkaline Phosphatase 64    AST (SGOT) 25    ALT (SGPT) 29    WBC 6.08    Hemoglobin 15.2    Hematocrit 44.1    Platelets 309    Total Cholesterol 197    Triglycerides 77    HDL Cholesterol 50    LDL Cholesterol  133    Hemoglobin A1C 5.70    PSA 0.814             Lab Results   Component Value Date    COVID19 Detected (C) 12/21/2021    BILIRUBINUR Negative 12/04/2023       Results  Labs   - Lipid Panel: LDL was 133 mg/dL   - Hemoglobin A1c: Prediabetic range    Procedures        Assessment and Plan    Diagnoses and all orders for this visit:    1. Class 3 severe obesity with body mass index (BMI) of 45.0 to 49.9 in adult, unspecified obesity type, unspecified whether serious comorbidity present (Primary)    2. Insomnia, unspecified type    3. Medication monitoring encounter  -     CBC & Differential; Future  -     Comprehensive Metabolic Panel; Future  -     Hemoglobin A1c; Future    4. Other migraine without status migrainosus, not intractable    5. Primary hypertension    6. Former smoker    7. Vitamin D deficiency  -     Vitamin D,25-Hydroxy; Future    8. Screening for prostate cancer  -     PSA Screen; Future    9. Prediabetes  -     CBC & Differential; Future  -     Comprehensive Metabolic Panel; Future  -     Hemoglobin A1c; Future    10. Pure hypercholesterolemia  -     CBC & Differential; Future  -     Comprehensive Metabolic Panel; Future  -     Lipid Panel; Future    Other orders  -     Semaglutide-Weight Management (Wegovy) 2.4 MG/0.75ML solution auto-injector; Inject 0.75 mL under the skin into the appropriate area as directed 1 (One) Time Per Week.  Dispense: 3 mL; Refill: 3        Assessment & Plan  1. Weight  management.  - Significant weight loss from 296 to 277 pounds.  - Currently on Wegovy, experiencing borderline headaches, a known side effect in 14 to 17% of users.  - Prefers to continue with Wegovy and increase the dose to 2.4 mg.  - Potential future use of Zepbound discussed as an alternative if headaches persist.    2. Sleep disturbances.  - Reports chronic sleep issues but declines medication due to previous experiences of feeling groggy in the morning.    3. Migraines.  - Migraines are under good control with metoprolol.  - Manages migraines as they come and tries to get ahead of them.    4. Hypertension.  - Blood pressure is well-controlled at 124/88 mmHg with losartan 25 mg daily and metoprolol 50 mg daily.    5. Smoking cessation.  - Quit smoking approximately 12 years ago.  - Recommended low-dose chest CT for lung cancer screening but declined at this time.    6. Health maintenance.  - LDL level is 133, and A1c is in the prediabetic range.  - Labs will be ordered in 4 months to monitor PSA, vitamin D levels, lipid panel, CBC, CMP, and A1c.    Follow-up  - Follow up in 4 months.       Medications Discontinued During This Encounter   Medication Reason    Semaglutide-Weight Management 1.7 MG/0.75ML solution auto-injector           Follow Up   Return in about 4 months (around 9/29/2025) for weight management.  Patient was given instructions and counseling regarding his condition or for health maintenance advice. Please see specific information pulled into the AVS if appropriate.     Patient or patient representative verbalized consent for the use of Ambient Listening during the visit with  David Markham DO for chart documentation. 5/29/2025  11:10 EDT    David Markham DO  05/29/25  11:21 EDT

## 2025-06-30 RX ORDER — SEMAGLUTIDE 2.4 MG/.75ML
2.4 INJECTION, SOLUTION SUBCUTANEOUS WEEKLY
Qty: 3 ML | Refills: 3 | Status: SHIPPED | OUTPATIENT
Start: 2025-06-30

## 2025-08-04 ENCOUNTER — APPOINTMENT (OUTPATIENT)
Dept: GENERAL RADIOLOGY | Facility: HOSPITAL | Age: 55
End: 2025-08-04
Payer: MEDICARE

## 2025-08-04 ENCOUNTER — APPOINTMENT (OUTPATIENT)
Dept: CT IMAGING | Facility: HOSPITAL | Age: 55
End: 2025-08-04
Payer: MEDICARE

## 2025-08-04 ENCOUNTER — HOSPITAL ENCOUNTER (EMERGENCY)
Facility: HOSPITAL | Age: 55
Discharge: HOME | End: 2025-08-04
Attending: EMERGENCY MEDICINE | Admitting: EMERGENCY MEDICINE
Payer: MEDICARE

## 2025-08-04 VITALS
OXYGEN SATURATION: 98 % | DIASTOLIC BLOOD PRESSURE: 112 MMHG | WEIGHT: 272.71 LBS | HEIGHT: 68 IN | BODY MASS INDEX: 41.33 KG/M2 | RESPIRATION RATE: 14 BRPM | SYSTOLIC BLOOD PRESSURE: 156 MMHG | TEMPERATURE: 98.3 F | HEART RATE: 8 BPM

## 2025-08-04 DIAGNOSIS — N17.9 AKI (ACUTE KIDNEY INJURY): ICD-10-CM

## 2025-08-04 DIAGNOSIS — N20.0 KIDNEY STONE: ICD-10-CM

## 2025-08-04 DIAGNOSIS — S49.91XA INJURY OF RIGHT CLAVICLE, INITIAL ENCOUNTER: Primary | ICD-10-CM

## 2025-08-04 LAB
ALBUMIN SERPL-MCNC: 4.3 G/DL (ref 3.5–5.2)
ALBUMIN/GLOB SERPL: 1.7 G/DL
ALP SERPL-CCNC: 60 U/L (ref 39–117)
ALT SERPL W P-5'-P-CCNC: 22 U/L (ref 1–41)
ANION GAP SERPL CALCULATED.3IONS-SCNC: 11.1 MMOL/L (ref 5–15)
AST SERPL-CCNC: 27 U/L (ref 1–40)
BACTERIA UR QL AUTO: ABNORMAL /HPF
BASOPHILS # BLD AUTO: 0.04 10*3/MM3 (ref 0–0.2)
BASOPHILS NFR BLD AUTO: 0.4 % (ref 0–1.5)
BILIRUB SERPL-MCNC: 0.5 MG/DL (ref 0–1.2)
BILIRUB UR QL STRIP: NEGATIVE
BUN SERPL-MCNC: 23.9 MG/DL (ref 6–20)
BUN/CREAT SERPL: 9.9 (ref 7–25)
CALCIUM SPEC-SCNC: 9.7 MG/DL (ref 8.6–10.5)
CHLORIDE SERPL-SCNC: 109 MMOL/L (ref 98–107)
CLARITY UR: CLEAR
CO2 SERPL-SCNC: 21.9 MMOL/L (ref 22–29)
COLOR UR: YELLOW
CREAT SERPL-MCNC: 2.42 MG/DL (ref 0.76–1.27)
DEPRECATED RDW RBC AUTO: 39.8 FL (ref 37–54)
EGFRCR SERPLBLD CKD-EPI 2021: 30.6 ML/MIN/1.73
EOSINOPHIL # BLD AUTO: 0.17 10*3/MM3 (ref 0–0.4)
EOSINOPHIL NFR BLD AUTO: 1.7 % (ref 0.3–6.2)
ERYTHROCYTE [DISTWIDTH] IN BLOOD BY AUTOMATED COUNT: 12.3 % (ref 12.3–15.4)
GLOBULIN UR ELPH-MCNC: 2.5 GM/DL
GLUCOSE SERPL-MCNC: 88 MG/DL (ref 65–99)
GLUCOSE UR STRIP-MCNC: NEGATIVE MG/DL
HCT VFR BLD AUTO: 42.3 % (ref 37.5–51)
HGB BLD-MCNC: 14.5 G/DL (ref 13–17.7)
HGB UR QL STRIP.AUTO: ABNORMAL
HYALINE CASTS UR QL AUTO: ABNORMAL /LPF
IMM GRANULOCYTES # BLD AUTO: 0.06 10*3/MM3 (ref 0–0.05)
IMM GRANULOCYTES NFR BLD AUTO: 0.6 % (ref 0–0.5)
KETONES UR QL STRIP: ABNORMAL
LEUKOCYTE ESTERASE UR QL STRIP.AUTO: ABNORMAL
LYMPHOCYTES # BLD AUTO: 1.34 10*3/MM3 (ref 0.7–3.1)
LYMPHOCYTES NFR BLD AUTO: 13.3 % (ref 19.6–45.3)
MCH RBC QN AUTO: 30.6 PG (ref 26.6–33)
MCHC RBC AUTO-ENTMCNC: 34.3 G/DL (ref 31.5–35.7)
MCV RBC AUTO: 89.2 FL (ref 79–97)
MONOCYTES # BLD AUTO: 0.49 10*3/MM3 (ref 0.1–0.9)
MONOCYTES NFR BLD AUTO: 4.8 % (ref 5–12)
NEUTROPHILS NFR BLD AUTO: 79.2 % (ref 42.7–76)
NEUTROPHILS NFR BLD AUTO: 8.01 10*3/MM3 (ref 1.7–7)
NITRITE UR QL STRIP: NEGATIVE
NRBC BLD AUTO-RTO: 0 /100 WBC (ref 0–0.2)
PH UR STRIP.AUTO: 6 [PH] (ref 5–8)
PLATELET # BLD AUTO: 270 10*3/MM3 (ref 140–450)
PMV BLD AUTO: 10.9 FL (ref 6–12)
POTASSIUM SERPL-SCNC: 3.9 MMOL/L (ref 3.5–5.2)
PROT SERPL-MCNC: 6.8 G/DL (ref 6–8.5)
PROT UR QL STRIP: ABNORMAL
RBC # BLD AUTO: 4.74 10*6/MM3 (ref 4.14–5.8)
RBC # UR STRIP: ABNORMAL /HPF
REF LAB TEST METHOD: ABNORMAL
SODIUM SERPL-SCNC: 142 MMOL/L (ref 136–145)
SP GR UR STRIP: >=1.03 (ref 1–1.03)
SQUAMOUS #/AREA URNS HPF: ABNORMAL /HPF
UROBILINOGEN UR QL STRIP: ABNORMAL
WBC # UR STRIP: ABNORMAL /HPF
WBC NRBC COR # BLD AUTO: 10.11 10*3/MM3 (ref 3.4–10.8)

## 2025-08-04 PROCEDURE — 25010000002 FENTANYL CITRATE (PF) 50 MCG/ML SOLUTION: Performed by: EMERGENCY MEDICINE

## 2025-08-04 PROCEDURE — 80053 COMPREHEN METABOLIC PANEL: CPT | Performed by: EMERGENCY MEDICINE

## 2025-08-04 PROCEDURE — 70450 CT HEAD/BRAIN W/O DYE: CPT

## 2025-08-04 PROCEDURE — 72125 CT NECK SPINE W/O DYE: CPT

## 2025-08-04 PROCEDURE — 81001 URINALYSIS AUTO W/SCOPE: CPT | Performed by: EMERGENCY MEDICINE

## 2025-08-04 PROCEDURE — 73030 X-RAY EXAM OF SHOULDER: CPT

## 2025-08-04 PROCEDURE — 74177 CT ABD & PELVIS W/CONTRAST: CPT

## 2025-08-04 PROCEDURE — 25510000001 IOPAMIDOL PER 1 ML: Performed by: EMERGENCY MEDICINE

## 2025-08-04 PROCEDURE — 71260 CT THORAX DX C+: CPT

## 2025-08-04 PROCEDURE — 85025 COMPLETE CBC W/AUTO DIFF WBC: CPT | Performed by: EMERGENCY MEDICINE

## 2025-08-04 PROCEDURE — 25810000003 SODIUM CHLORIDE 0.9 % SOLUTION: Performed by: EMERGENCY MEDICINE

## 2025-08-04 PROCEDURE — 96361 HYDRATE IV INFUSION ADD-ON: CPT

## 2025-08-04 PROCEDURE — 96374 THER/PROPH/DIAG INJ IV PUSH: CPT

## 2025-08-04 PROCEDURE — 36415 COLL VENOUS BLD VENIPUNCTURE: CPT

## 2025-08-04 PROCEDURE — 99285 EMERGENCY DEPT VISIT HI MDM: CPT

## 2025-08-04 RX ORDER — HYDROCODONE BITARTRATE AND ACETAMINOPHEN 5; 325 MG/1; MG/1
1 TABLET ORAL EVERY 6 HOURS PRN
Qty: 15 TABLET | Refills: 0 | Status: SHIPPED | OUTPATIENT
Start: 2025-08-04 | End: 2025-08-04

## 2025-08-04 RX ORDER — FENTANYL CITRATE 50 UG/ML
50 INJECTION, SOLUTION INTRAMUSCULAR; INTRAVENOUS ONCE
Refills: 0 | Status: COMPLETED | OUTPATIENT
Start: 2025-08-04 | End: 2025-08-04

## 2025-08-04 RX ORDER — HYDROCODONE BITARTRATE AND ACETAMINOPHEN 5; 325 MG/1; MG/1
1 TABLET ORAL ONCE
Refills: 0 | Status: COMPLETED | OUTPATIENT
Start: 2025-08-04 | End: 2025-08-04

## 2025-08-04 RX ORDER — HYDROCODONE BITARTRATE AND ACETAMINOPHEN 5; 325 MG/1; MG/1
1 TABLET ORAL EVERY 6 HOURS PRN
Qty: 15 TABLET | Refills: 0 | Status: SHIPPED | OUTPATIENT
Start: 2025-08-04

## 2025-08-04 RX ORDER — IOPAMIDOL 755 MG/ML
100 INJECTION, SOLUTION INTRAVASCULAR
Status: COMPLETED | OUTPATIENT
Start: 2025-08-04 | End: 2025-08-04

## 2025-08-04 RX ADMIN — HYDROCODONE BITARTRATE AND ACETAMINOPHEN 1 TABLET: 5; 325 TABLET ORAL at 15:46

## 2025-08-04 RX ADMIN — SODIUM CHLORIDE 1000 ML: 9 INJECTION, SOLUTION INTRAVENOUS at 13:20

## 2025-08-04 RX ADMIN — FENTANYL CITRATE 50 MCG: 50 INJECTION INTRAMUSCULAR; INTRAVENOUS at 11:45

## 2025-08-04 RX ADMIN — SODIUM CHLORIDE 1000 ML: 9 INJECTION, SOLUTION INTRAVENOUS at 14:35

## 2025-08-04 RX ADMIN — IOPAMIDOL 90 ML: 755 INJECTION, SOLUTION INTRAVENOUS at 13:11

## 2025-08-05 ENCOUNTER — OFFICE VISIT (OUTPATIENT)
Dept: UROLOGY | Age: 55
End: 2025-08-05
Payer: MEDICARE

## 2025-08-05 ENCOUNTER — PREP FOR SURGERY (OUTPATIENT)
Dept: OTHER | Facility: HOSPITAL | Age: 55
End: 2025-08-05
Payer: MEDICARE

## 2025-08-05 ENCOUNTER — OFFICE VISIT (OUTPATIENT)
Dept: FAMILY MEDICINE CLINIC | Facility: CLINIC | Age: 55
End: 2025-08-05
Payer: MEDICARE

## 2025-08-05 VITALS
WEIGHT: 268.4 LBS | HEIGHT: 68 IN | DIASTOLIC BLOOD PRESSURE: 84 MMHG | SYSTOLIC BLOOD PRESSURE: 128 MMHG | TEMPERATURE: 97.8 F | HEART RATE: 88 BPM | BODY MASS INDEX: 40.68 KG/M2 | OXYGEN SATURATION: 97 %

## 2025-08-05 VITALS — HEIGHT: 68 IN | WEIGHT: 272 LBS | BODY MASS INDEX: 41.22 KG/M2

## 2025-08-05 DIAGNOSIS — N20.1 URETERAL STONE: Primary | ICD-10-CM

## 2025-08-05 DIAGNOSIS — N20.0 NEPHROLITHIASIS: Primary | ICD-10-CM

## 2025-08-05 DIAGNOSIS — I10 PRIMARY HYPERTENSION: ICD-10-CM

## 2025-08-05 DIAGNOSIS — N13.9 OBSTRUCTIVE UROPATHY: ICD-10-CM

## 2025-08-05 DIAGNOSIS — S42.031S CLOSED DISPLACED FRACTURE OF ACROMIAL END OF RIGHT CLAVICLE, SEQUELA: ICD-10-CM

## 2025-08-05 PROCEDURE — 1159F MED LIST DOCD IN RCRD: CPT | Performed by: UROLOGY

## 2025-08-05 PROCEDURE — 1160F RVW MEDS BY RX/DR IN RCRD: CPT | Performed by: UROLOGY

## 2025-08-05 PROCEDURE — 3079F DIAST BP 80-89 MM HG: CPT | Performed by: FAMILY MEDICINE

## 2025-08-05 PROCEDURE — 99203 OFFICE O/P NEW LOW 30 MIN: CPT | Performed by: UROLOGY

## 2025-08-05 PROCEDURE — 1125F AMNT PAIN NOTED PAIN PRSNT: CPT | Performed by: FAMILY MEDICINE

## 2025-08-05 PROCEDURE — G2211 COMPLEX E/M VISIT ADD ON: HCPCS | Performed by: FAMILY MEDICINE

## 2025-08-05 PROCEDURE — 99214 OFFICE O/P EST MOD 30 MIN: CPT | Performed by: FAMILY MEDICINE

## 2025-08-05 PROCEDURE — 3074F SYST BP LT 130 MM HG: CPT | Performed by: FAMILY MEDICINE

## 2025-08-05 RX ORDER — SODIUM CHLORIDE 0.9 % (FLUSH) 0.9 %
3 SYRINGE (ML) INJECTION EVERY 12 HOURS SCHEDULED
OUTPATIENT
Start: 2025-08-05

## 2025-08-05 RX ORDER — SODIUM CHLORIDE 0.9 % (FLUSH) 0.9 %
10 SYRINGE (ML) INJECTION AS NEEDED
OUTPATIENT
Start: 2025-08-05

## 2025-08-05 RX ORDER — METOPROLOL SUCCINATE 50 MG/1
50 TABLET, EXTENDED RELEASE ORAL DAILY
Qty: 90 TABLET | Refills: 3 | Status: SHIPPED | OUTPATIENT
Start: 2025-08-05

## 2025-08-05 RX ORDER — SODIUM CHLORIDE 9 MG/ML
40 INJECTION, SOLUTION INTRAVENOUS AS NEEDED
OUTPATIENT
Start: 2025-08-05

## 2025-08-05 RX ORDER — ASPIRIN 81 MG/1
81 TABLET ORAL DAILY
Qty: 90 TABLET | Refills: 3 | Status: SHIPPED | OUTPATIENT
Start: 2025-08-05

## 2025-08-06 ENCOUNTER — OFFICE VISIT (OUTPATIENT)
Dept: ORTHOPEDIC SURGERY | Facility: CLINIC | Age: 55
End: 2025-08-06
Payer: MEDICARE

## 2025-08-06 VITALS — HEIGHT: 67 IN | WEIGHT: 268 LBS | BODY MASS INDEX: 42.06 KG/M2

## 2025-08-06 DIAGNOSIS — S42.001A CLOSED DISPLACED FRACTURE OF RIGHT CLAVICLE, UNSPECIFIED PART OF CLAVICLE, INITIAL ENCOUNTER: Primary | ICD-10-CM

## 2025-08-07 ENCOUNTER — PATIENT ROUNDING (BHMG ONLY) (OUTPATIENT)
Dept: UROLOGY | Age: 55
End: 2025-08-07
Payer: MEDICARE

## 2025-08-08 ENCOUNTER — TELEPHONE (OUTPATIENT)
Dept: UROLOGY | Age: 55
End: 2025-08-08
Payer: MEDICARE

## 2025-08-14 ENCOUNTER — ANESTHESIA (OUTPATIENT)
Dept: PERIOP | Facility: HOSPITAL | Age: 55
End: 2025-08-14
Payer: MEDICARE

## 2025-08-14 ENCOUNTER — HOSPITAL ENCOUNTER (OUTPATIENT)
Facility: HOSPITAL | Age: 55
Discharge: HOME | End: 2025-08-14
Attending: UROLOGY | Admitting: UROLOGY
Payer: MEDICARE

## 2025-08-14 ENCOUNTER — APPOINTMENT (OUTPATIENT)
Dept: GENERAL RADIOLOGY | Facility: HOSPITAL | Age: 55
End: 2025-08-14
Payer: MEDICARE

## 2025-08-14 ENCOUNTER — ANESTHESIA EVENT (OUTPATIENT)
Dept: PERIOP | Facility: HOSPITAL | Age: 55
End: 2025-08-14
Payer: MEDICARE

## 2025-08-14 VITALS
RESPIRATION RATE: 16 BRPM | HEART RATE: 80 BPM | TEMPERATURE: 98 F | OXYGEN SATURATION: 98 % | DIASTOLIC BLOOD PRESSURE: 95 MMHG | BODY MASS INDEX: 39.59 KG/M2 | SYSTOLIC BLOOD PRESSURE: 140 MMHG | HEIGHT: 68 IN | WEIGHT: 261.25 LBS

## 2025-08-14 DIAGNOSIS — N20.1 URETERAL STONE: ICD-10-CM

## 2025-08-14 DIAGNOSIS — N20.0 NEPHROLITHIASIS: Primary | ICD-10-CM

## 2025-08-14 LAB
ANION GAP SERPL CALCULATED.3IONS-SCNC: 12.3 MMOL/L (ref 5–15)
BASOPHILS # BLD AUTO: 0.05 10*3/MM3 (ref 0–0.2)
BASOPHILS NFR BLD AUTO: 0.6 % (ref 0–1.5)
BUN SERPL-MCNC: 27.8 MG/DL (ref 6–20)
BUN/CREAT SERPL: 12.8 (ref 7–25)
CALCIUM SPEC-SCNC: 9.7 MG/DL (ref 8.6–10.5)
CHLORIDE SERPL-SCNC: 107 MMOL/L (ref 98–107)
CO2 SERPL-SCNC: 21.7 MMOL/L (ref 22–29)
CREAT SERPL-MCNC: 2.17 MG/DL (ref 0.76–1.27)
DEPRECATED RDW RBC AUTO: 39.4 FL (ref 37–54)
EGFRCR SERPLBLD CKD-EPI 2021: 34.9 ML/MIN/1.73
EOSINOPHIL # BLD AUTO: 0.31 10*3/MM3 (ref 0–0.4)
EOSINOPHIL NFR BLD AUTO: 4 % (ref 0.3–6.2)
ERYTHROCYTE [DISTWIDTH] IN BLOOD BY AUTOMATED COUNT: 12.2 % (ref 12.3–15.4)
GLUCOSE SERPL-MCNC: 107 MG/DL (ref 65–99)
HCT VFR BLD AUTO: 44.7 % (ref 37.5–51)
HGB BLD-MCNC: 15.3 G/DL (ref 13–17.7)
IMM GRANULOCYTES # BLD AUTO: 0.04 10*3/MM3 (ref 0–0.05)
IMM GRANULOCYTES NFR BLD AUTO: 0.5 % (ref 0–0.5)
LYMPHOCYTES # BLD AUTO: 2.61 10*3/MM3 (ref 0.7–3.1)
LYMPHOCYTES NFR BLD AUTO: 33.3 % (ref 19.6–45.3)
MCH RBC QN AUTO: 30.3 PG (ref 26.6–33)
MCHC RBC AUTO-ENTMCNC: 34.2 G/DL (ref 31.5–35.7)
MCV RBC AUTO: 88.5 FL (ref 79–97)
MONOCYTES # BLD AUTO: 0.56 10*3/MM3 (ref 0.1–0.9)
MONOCYTES NFR BLD AUTO: 7.2 % (ref 5–12)
NEUTROPHILS NFR BLD AUTO: 4.26 10*3/MM3 (ref 1.7–7)
NEUTROPHILS NFR BLD AUTO: 54.4 % (ref 42.7–76)
NRBC BLD AUTO-RTO: 0 /100 WBC (ref 0–0.2)
PLATELET # BLD AUTO: 364 10*3/MM3 (ref 140–450)
PMV BLD AUTO: 9.9 FL (ref 6–12)
POTASSIUM SERPL-SCNC: 4.2 MMOL/L (ref 3.5–5.2)
QT INTERVAL: 349 MS
QTC INTERVAL: 423 MS
RBC # BLD AUTO: 5.05 10*6/MM3 (ref 4.14–5.8)
SODIUM SERPL-SCNC: 141 MMOL/L (ref 136–145)
WBC NRBC COR # BLD AUTO: 7.83 10*3/MM3 (ref 3.4–10.8)

## 2025-08-14 PROCEDURE — C2617 STENT, NON-COR, TEM W/O DEL: HCPCS | Performed by: UROLOGY

## 2025-08-14 PROCEDURE — 85025 COMPLETE CBC W/AUTO DIFF WBC: CPT | Performed by: UROLOGY

## 2025-08-14 PROCEDURE — 25010000002 PHENYLEPHRINE HCL-NACL 1000-0.9 MCG/10ML-% SOLUTION PREFILLED SYRINGE

## 2025-08-14 PROCEDURE — 88300 SURGICAL PATH GROSS: CPT | Performed by: UROLOGY

## 2025-08-14 PROCEDURE — 93010 ELECTROCARDIOGRAM REPORT: CPT | Performed by: INTERNAL MEDICINE

## 2025-08-14 PROCEDURE — 93005 ELECTROCARDIOGRAM TRACING: CPT | Performed by: UROLOGY

## 2025-08-14 PROCEDURE — C1747: HCPCS | Performed by: UROLOGY

## 2025-08-14 PROCEDURE — 25010000002 SUGAMMADEX 200 MG/2ML SOLUTION

## 2025-08-14 PROCEDURE — 76000 FLUOROSCOPY <1 HR PHYS/QHP: CPT

## 2025-08-14 PROCEDURE — 25010000002 ONDANSETRON PER 1 MG

## 2025-08-14 PROCEDURE — C1758 CATHETER, URETERAL: HCPCS | Performed by: UROLOGY

## 2025-08-14 PROCEDURE — 25010000002 LIDOCAINE PF 2% 2 % SOLUTION

## 2025-08-14 PROCEDURE — 25010000002 PROPOFOL 10 MG/ML EMULSION

## 2025-08-14 PROCEDURE — 80048 BASIC METABOLIC PNL TOTAL CA: CPT | Performed by: UROLOGY

## 2025-08-14 PROCEDURE — C1769 GUIDE WIRE: HCPCS | Performed by: UROLOGY

## 2025-08-14 PROCEDURE — 25010000002 FENTANYL CITRATE (PF) 50 MCG/ML SOLUTION

## 2025-08-14 PROCEDURE — 25010000002 DEXAMETHASONE PER 1 MG

## 2025-08-14 PROCEDURE — 82365 CALCULUS SPECTROSCOPY: CPT | Performed by: UROLOGY

## 2025-08-14 PROCEDURE — 52356 CYSTO/URETERO W/LITHOTRIPSY: CPT | Performed by: UROLOGY

## 2025-08-14 PROCEDURE — C1894 INTRO/SHEATH, NON-LASER: HCPCS | Performed by: UROLOGY

## 2025-08-14 PROCEDURE — 25810000003 LACTATED RINGERS PER 1000 ML: Performed by: ANESTHESIOLOGY

## 2025-08-14 PROCEDURE — 25010000002 CEFAZOLIN PER 500 MG: Performed by: UROLOGY

## 2025-08-14 PROCEDURE — 25010000002 MIDAZOLAM PER 1MG: Performed by: ANESTHESIOLOGY

## 2025-08-14 DEVICE — URETERAL STENT
Type: IMPLANTABLE DEVICE | Site: URETER | Status: FUNCTIONAL
Brand: ASCERTA™

## 2025-08-14 RX ORDER — OXYCODONE HYDROCHLORIDE 5 MG/1
5 TABLET ORAL
Status: DISCONTINUED | OUTPATIENT
Start: 2025-08-14 | End: 2025-08-14 | Stop reason: HOSPADM

## 2025-08-14 RX ORDER — ONDANSETRON 4 MG/1
4 TABLET, ORALLY DISINTEGRATING ORAL ONCE AS NEEDED
Status: DISCONTINUED | OUTPATIENT
Start: 2025-08-14 | End: 2025-08-14 | Stop reason: HOSPADM

## 2025-08-14 RX ORDER — SODIUM CHLORIDE 0.9 % (FLUSH) 0.9 %
3 SYRINGE (ML) INJECTION EVERY 12 HOURS SCHEDULED
Status: DISCONTINUED | OUTPATIENT
Start: 2025-08-14 | End: 2025-08-14 | Stop reason: HOSPADM

## 2025-08-14 RX ORDER — DEXAMETHASONE SODIUM PHOSPHATE 4 MG/ML
INJECTION, SOLUTION INTRA-ARTICULAR; INTRALESIONAL; INTRAMUSCULAR; INTRAVENOUS; SOFT TISSUE AS NEEDED
Status: DISCONTINUED | OUTPATIENT
Start: 2025-08-14 | End: 2025-08-14 | Stop reason: SURG

## 2025-08-14 RX ORDER — SODIUM CHLORIDE, SODIUM LACTATE, POTASSIUM CHLORIDE, CALCIUM CHLORIDE 600; 310; 30; 20 MG/100ML; MG/100ML; MG/100ML; MG/100ML
9 INJECTION, SOLUTION INTRAVENOUS CONTINUOUS PRN
Status: DISCONTINUED | OUTPATIENT
Start: 2025-08-14 | End: 2025-08-14 | Stop reason: HOSPADM

## 2025-08-14 RX ORDER — SODIUM CHLORIDE 0.9 % (FLUSH) 0.9 %
10 SYRINGE (ML) INJECTION AS NEEDED
Status: DISCONTINUED | OUTPATIENT
Start: 2025-08-14 | End: 2025-08-14 | Stop reason: HOSPADM

## 2025-08-14 RX ORDER — ONDANSETRON 2 MG/ML
4 INJECTION INTRAMUSCULAR; INTRAVENOUS ONCE AS NEEDED
Status: DISCONTINUED | OUTPATIENT
Start: 2025-08-14 | End: 2025-08-14 | Stop reason: HOSPADM

## 2025-08-14 RX ORDER — ROCURONIUM BROMIDE 10 MG/ML
INJECTION, SOLUTION INTRAVENOUS AS NEEDED
Status: DISCONTINUED | OUTPATIENT
Start: 2025-08-14 | End: 2025-08-14 | Stop reason: SURG

## 2025-08-14 RX ORDER — MIDAZOLAM HYDROCHLORIDE 2 MG/2ML
2 INJECTION, SOLUTION INTRAMUSCULAR; INTRAVENOUS ONCE
Status: COMPLETED | OUTPATIENT
Start: 2025-08-14 | End: 2025-08-14

## 2025-08-14 RX ORDER — PROMETHAZINE HYDROCHLORIDE 25 MG/1
25 SUPPOSITORY RECTAL ONCE AS NEEDED
Status: DISCONTINUED | OUTPATIENT
Start: 2025-08-14 | End: 2025-08-14 | Stop reason: HOSPADM

## 2025-08-14 RX ORDER — PROPOFOL 10 MG/ML
VIAL (ML) INTRAVENOUS AS NEEDED
Status: DISCONTINUED | OUTPATIENT
Start: 2025-08-14 | End: 2025-08-14 | Stop reason: SURG

## 2025-08-14 RX ORDER — PHENYLEPHRINE HCL IN 0.9% NACL 1 MG/10 ML
SYRINGE (ML) INTRAVENOUS AS NEEDED
Status: DISCONTINUED | OUTPATIENT
Start: 2025-08-14 | End: 2025-08-14 | Stop reason: SURG

## 2025-08-14 RX ORDER — LIDOCAINE HYDROCHLORIDE 20 MG/ML
INJECTION, SOLUTION EPIDURAL; INFILTRATION; INTRACAUDAL; PERINEURAL AS NEEDED
Status: DISCONTINUED | OUTPATIENT
Start: 2025-08-14 | End: 2025-08-14 | Stop reason: SURG

## 2025-08-14 RX ORDER — SODIUM CHLORIDE 9 MG/ML
40 INJECTION, SOLUTION INTRAVENOUS AS NEEDED
Status: DISCONTINUED | OUTPATIENT
Start: 2025-08-14 | End: 2025-08-14 | Stop reason: HOSPADM

## 2025-08-14 RX ORDER — HYDROCODONE BITARTRATE AND ACETAMINOPHEN 5; 325 MG/1; MG/1
1 TABLET ORAL EVERY 8 HOURS PRN
Qty: 15 TABLET | Refills: 0 | Status: SHIPPED | OUTPATIENT
Start: 2025-08-14

## 2025-08-14 RX ORDER — FENTANYL CITRATE 50 UG/ML
INJECTION, SOLUTION INTRAMUSCULAR; INTRAVENOUS AS NEEDED
Status: DISCONTINUED | OUTPATIENT
Start: 2025-08-14 | End: 2025-08-14 | Stop reason: SURG

## 2025-08-14 RX ORDER — PROMETHAZINE HYDROCHLORIDE 12.5 MG/1
12.5 TABLET ORAL ONCE AS NEEDED
Status: DISCONTINUED | OUTPATIENT
Start: 2025-08-14 | End: 2025-08-14 | Stop reason: HOSPADM

## 2025-08-14 RX ORDER — ONDANSETRON 2 MG/ML
INJECTION INTRAMUSCULAR; INTRAVENOUS AS NEEDED
Status: DISCONTINUED | OUTPATIENT
Start: 2025-08-14 | End: 2025-08-14 | Stop reason: SURG

## 2025-08-14 RX ORDER — PROMETHAZINE HYDROCHLORIDE 25 MG/1
25 TABLET ORAL ONCE AS NEEDED
Status: DISCONTINUED | OUTPATIENT
Start: 2025-08-14 | End: 2025-08-14 | Stop reason: HOSPADM

## 2025-08-14 RX ORDER — ACETAMINOPHEN 325 MG/1
650 TABLET ORAL ONCE
Status: DISCONTINUED | OUTPATIENT
Start: 2025-08-14 | End: 2025-08-14 | Stop reason: HOSPADM

## 2025-08-14 RX ORDER — MAGNESIUM HYDROXIDE 1200 MG/15ML
LIQUID ORAL AS NEEDED
Status: DISCONTINUED | OUTPATIENT
Start: 2025-08-14 | End: 2025-08-14 | Stop reason: HOSPADM

## 2025-08-14 RX ORDER — HYDROCODONE BITARTRATE AND ACETAMINOPHEN 5; 325 MG/1; MG/1
1 TABLET ORAL ONCE AS NEEDED
Status: DISCONTINUED | OUTPATIENT
Start: 2025-08-14 | End: 2025-08-14 | Stop reason: HOSPADM

## 2025-08-14 RX ORDER — ACETAMINOPHEN 500 MG
1000 TABLET ORAL ONCE
Status: COMPLETED | OUTPATIENT
Start: 2025-08-14 | End: 2025-08-14

## 2025-08-14 RX ADMIN — ROCURONIUM BROMIDE 50 MG: 10 INJECTION, SOLUTION INTRAVENOUS at 08:15

## 2025-08-14 RX ADMIN — FENTANYL CITRATE 50 MCG: 50 INJECTION, SOLUTION INTRAMUSCULAR; INTRAVENOUS at 08:20

## 2025-08-14 RX ADMIN — DEXAMETHASONE SODIUM PHOSPHATE 4 MG: 4 INJECTION, SOLUTION INTRAMUSCULAR; INTRAVENOUS at 08:26

## 2025-08-14 RX ADMIN — FENTANYL CITRATE 50 MCG: 50 INJECTION, SOLUTION INTRAMUSCULAR; INTRAVENOUS at 08:15

## 2025-08-14 RX ADMIN — PROPOFOL 200 MG: 10 INJECTION, EMULSION INTRAVENOUS at 08:15

## 2025-08-14 RX ADMIN — ONDANSETRON 4 MG: 2 INJECTION, SOLUTION INTRAMUSCULAR; INTRAVENOUS at 08:26

## 2025-08-14 RX ADMIN — MIDAZOLAM HYDROCHLORIDE 2 MG: 1 INJECTION, SOLUTION INTRAMUSCULAR; INTRAVENOUS at 07:57

## 2025-08-14 RX ADMIN — SODIUM CHLORIDE 2000 MG: 9 INJECTION, SOLUTION INTRAVENOUS at 08:20

## 2025-08-14 RX ADMIN — Medication 100 MCG: at 08:26

## 2025-08-14 RX ADMIN — LIDOCAINE HYDROCHLORIDE 50 MG: 20 INJECTION, SOLUTION EPIDURAL; INFILTRATION; INTRACAUDAL; PERINEURAL at 08:15

## 2025-08-14 RX ADMIN — ACETAMINOPHEN 1000 MG: 500 TABLET, FILM COATED ORAL at 07:57

## 2025-08-14 RX ADMIN — SODIUM CHLORIDE, POTASSIUM CHLORIDE, SODIUM LACTATE AND CALCIUM CHLORIDE 9 ML/HR: 600; 310; 30; 20 INJECTION, SOLUTION INTRAVENOUS at 07:45

## 2025-08-14 RX ADMIN — SUGAMMADEX 200 MG: 100 INJECTION, SOLUTION INTRAVENOUS at 09:02

## 2025-08-18 LAB
LAB AP CASE REPORT: NORMAL
LAB AP CLINICAL INFORMATION: NORMAL
PATH REPORT.FINAL DX SPEC: NORMAL
PATH REPORT.GROSS SPEC: NORMAL

## 2025-08-18 RX ORDER — DICLOFENAC SODIUM 75 MG/1
75 TABLET, DELAYED RELEASE ORAL 2 TIMES DAILY PRN
Qty: 60 TABLET | Refills: 1 | Status: CANCELLED | OUTPATIENT
Start: 2025-08-18

## 2025-08-18 RX ORDER — BUSPIRONE HYDROCHLORIDE 10 MG/1
5 TABLET ORAL 2 TIMES DAILY
Qty: 90 TABLET | Refills: 3 | Status: SHIPPED | OUTPATIENT
Start: 2025-08-18 | End: 2025-08-20 | Stop reason: SDUPTHER

## 2025-08-18 RX ORDER — DICLOFENAC SODIUM 75 MG/1
75 TABLET, DELAYED RELEASE ORAL 2 TIMES DAILY PRN
Qty: 60 TABLET | Refills: 1 | Status: SHIPPED | OUTPATIENT
Start: 2025-08-18

## 2025-08-18 RX ORDER — METOPROLOL SUCCINATE 50 MG/1
50 TABLET, EXTENDED RELEASE ORAL DAILY
Qty: 90 TABLET | Refills: 3 | Status: CANCELLED | OUTPATIENT
Start: 2025-08-18

## 2025-08-20 RX ORDER — BUSPIRONE HYDROCHLORIDE 10 MG/1
5 TABLET ORAL 2 TIMES DAILY
Qty: 90 TABLET | Refills: 3 | Status: SHIPPED | OUTPATIENT
Start: 2025-08-20

## 2025-08-22 ENCOUNTER — PROCEDURE VISIT (OUTPATIENT)
Dept: UROLOGY | Age: 55
End: 2025-08-22
Payer: MEDICARE

## 2025-08-22 DIAGNOSIS — N20.0 NEPHROLITHIASIS: Primary | ICD-10-CM

## 2025-08-27 ENCOUNTER — OFFICE VISIT (OUTPATIENT)
Dept: ORTHOPEDIC SURGERY | Facility: CLINIC | Age: 55
End: 2025-08-27
Payer: MEDICARE

## 2025-08-27 VITALS
HEIGHT: 68 IN | HEART RATE: 81 BPM | OXYGEN SATURATION: 94 % | SYSTOLIC BLOOD PRESSURE: 129 MMHG | DIASTOLIC BLOOD PRESSURE: 89 MMHG | WEIGHT: 260.14 LBS | BODY MASS INDEX: 39.43 KG/M2

## 2025-08-27 DIAGNOSIS — S42.001A CLOSED DISPLACED FRACTURE OF RIGHT CLAVICLE, UNSPECIFIED PART OF CLAVICLE, INITIAL ENCOUNTER: Primary | ICD-10-CM

## 2025-08-27 LAB
CALCIUM OXALATE DIHYDRATE MFR STONE IR: 10 %
COLOR STONE: NORMAL
COM MFR STONE: 80 %
HYDROXYAPATITE: 10 %
SIZE STONE: NORMAL MM
SPEC SOURCE SUBJ: NORMAL
WT STONE: 89 MG

## (undated) DEVICE — TBG PENCL TELESCP MEGADYNE SMOKE EVAC 10FT

## (undated) DEVICE — LEGGINGS, PAIR, CLEAR, STERILE: Brand: MEDLINE

## (undated) DEVICE — LINER SURG CANSTR SXN S/RIGD 1500CC

## (undated) DEVICE — STRAP STIRUP SLP RNG 19X3.5IN DISP

## (undated) DEVICE — CATH URETRL OPEN END W/CONNECT 5F 70CM

## (undated) DEVICE — SOLIDIFIER LIQLOC PLS 1500CC BT

## (undated) DEVICE — GAUZE,SPONGE,4"X4",16PLY,STRL,LF,10/TRAY: Brand: MEDLINE

## (undated) DEVICE — NITINOL STONE RETRIEVAL BASKET: Brand: ESCAPE

## (undated) DEVICE — SOL IRRG H2O PL/BG 1000ML STRL

## (undated) DEVICE — DRAPE,UNDERBUTTOCKS,PCH,STERILE: Brand: MEDLINE

## (undated) DEVICE — SLV SCD KN/LEN ADJ EXPRSS BLENDED MD 1P/U

## (undated) DEVICE — CYSTO PACK: Brand: MEDLINE INDUSTRIES, INC.

## (undated) DEVICE — Device: Brand: DEFENDO AIR/WATER/SUCTION AND BIOPSY VALVE

## (undated) DEVICE — CP SEAL HYSTEROSCOPE OMNI 5F STRL

## (undated) DEVICE — STERILE POLYISOPRENE POWDER-FREE SURGICAL GLOVES: Brand: PROTEXIS

## (undated) DEVICE — THE STERILE LIGHT HANDLE COVER IS USED WITH STERIS SURGICAL LIGHTING AND VISUALIZATION SYSTEMS.

## (undated) DEVICE — ADAPT UROLOK

## (undated) DEVICE — MINOR-LF: Brand: MEDLINE INDUSTRIES, INC.

## (undated) DEVICE — SYS IRR PUMP SGL ACTN VAC SYR 10CC

## (undated) DEVICE — SET, IRRIGATION CYSTO, Y-TYPE, 81": Brand: MEDLINE

## (undated) DEVICE — GLV SURG SENSICARE PI ORTHO SZ8 LF STRL

## (undated) DEVICE — SUT VIC 2/0 SH 27IN

## (undated) DEVICE — TUBING, SUCTION, 1/4" X 10', STRAIGHT: Brand: MEDLINE

## (undated) DEVICE — DEV OPN LIGASURE SM/JAW 28D 16.5MM 18.8CM 1P/U

## (undated) DEVICE — CATH 2L URETRL HC 6F 50CM

## (undated) DEVICE — BASIC SINGLE BASIN-LF: Brand: MEDLINE INDUSTRIES, INC.

## (undated) DEVICE — SKIN PREP TRAY W/CHG: Brand: MEDLINE INDUSTRIES, INC.

## (undated) DEVICE — TOWEL,OR,DSP,ST,BLUE,STD,4/PK,20PK/CS: Brand: MEDLINE

## (undated) DEVICE — CONN JET HYDRA H20 AUXILIARY DISP

## (undated) DEVICE — GW PTFE FIX/CORE STFF STR .038 3X150CM

## (undated) DEVICE — GLOVE,SURG,SENSICARE SLT,LF,PF,8: Brand: MEDLINE

## (undated) DEVICE — SHEATH URETRL ACC UROPASS 12/14F 38CM

## (undated) DEVICE — FIBR LASR MOSES 200 DFL 2J 80HZ

## (undated) DEVICE — SINGLE-USE DIGITAL FLEXIBLE URETEROSCOPE: Brand: LITHOVUE

## (undated) DEVICE — GOWN,REINFRCE,POLY,SIRUS,BREATH SLV,XXLG: Brand: MEDLINE

## (undated) DEVICE — INTENDED FOR TISSUE SEPARATION, AND OTHER PROCEDURES THAT REQUIRE A SHARP SURGICAL BLADE TO PUNCTURE OR CUT.: Brand: BARD-PARKER ® CARBON RIB-BACK BLADES

## (undated) DEVICE — Device

## (undated) DEVICE — GW ZIPWIRE STD/SHFT STR TPR/3CM .038IN 150CM

## (undated) DEVICE — SOL IRR NACL 0.9PCT BT 1000ML